# Patient Record
Sex: MALE | Race: BLACK OR AFRICAN AMERICAN | NOT HISPANIC OR LATINO | Employment: FULL TIME | URBAN - METROPOLITAN AREA
[De-identification: names, ages, dates, MRNs, and addresses within clinical notes are randomized per-mention and may not be internally consistent; named-entity substitution may affect disease eponyms.]

---

## 2023-02-21 LAB
APPEARANCE, URINE: CLEAR
BILIRUBIN, URINE: NEGATIVE
BLOOD, URINE: ABNORMAL
COLOR, URINE: YELLOW
GLUCOSE, URINE: NEGATIVE MG/DL
KETONES, URINE: NEGATIVE MG/DL
LEUKOCYTE ESTERASE, URINE: ABNORMAL
MUCUS, URINE: ABNORMAL /LPF
NITRITE, URINE: NEGATIVE
PH, URINE: 6.5 (ref 5–8)
PROTEIN, URINE: NEGATIVE MG/DL
RBC, URINE: 9 /HPF (ref 0–5)
SPECIFIC GRAVITY, URINE: 1.02 (ref 1–1.03)
UROBILINOGEN, URINE: <2 MG/DL (ref 0–1.9)
WBC, URINE: 9 /HPF (ref 0–5)

## 2023-02-22 LAB — URINE CULTURE: NO GROWTH

## 2023-03-13 LAB
ALANINE AMINOTRANSFERASE (SGPT) (U/L) IN SER/PLAS: 30 U/L (ref 10–52)
ALBUMIN (G/DL) IN SER/PLAS: 4.7 G/DL (ref 3.4–5)
ALKALINE PHOSPHATASE (U/L) IN SER/PLAS: 73 U/L (ref 33–136)
ANION GAP IN SER/PLAS: 13 MMOL/L (ref 10–20)
APPEARANCE, URINE: ABNORMAL
ASPARTATE AMINOTRANSFERASE (SGOT) (U/L) IN SER/PLAS: 21 U/L (ref 9–39)
BILIRUBIN TOTAL (MG/DL) IN SER/PLAS: 0.9 MG/DL (ref 0–1.2)
BILIRUBIN, URINE: NEGATIVE
BLOOD, URINE: NEGATIVE
CALCIUM (MG/DL) IN SER/PLAS: 10.2 MG/DL (ref 8.6–10.6)
CARBON DIOXIDE, TOTAL (MMOL/L) IN SER/PLAS: 28 MMOL/L (ref 21–32)
CHLORIDE (MMOL/L) IN SER/PLAS: 106 MMOL/L (ref 98–107)
COLOR, URINE: ABNORMAL
CREATININE (MG/DL) IN SER/PLAS: 1.32 MG/DL (ref 0.5–1.3)
ERYTHROCYTE DISTRIBUTION WIDTH (RATIO) BY AUTOMATED COUNT: 13.8 % (ref 11.5–14.5)
ERYTHROCYTE MEAN CORPUSCULAR HEMOGLOBIN CONCENTRATION (G/DL) BY AUTOMATED: 31.4 G/DL (ref 32–36)
ERYTHROCYTE MEAN CORPUSCULAR VOLUME (FL) BY AUTOMATED COUNT: 89 FL (ref 80–100)
ERYTHROCYTES (10*6/UL) IN BLOOD BY AUTOMATED COUNT: 5.05 X10E12/L (ref 4.5–5.9)
GFR MALE: 59 ML/MIN/1.73M2
GLUCOSE (MG/DL) IN SER/PLAS: 98 MG/DL (ref 74–99)
GLUCOSE, URINE: NEGATIVE MG/DL
HEMATOCRIT (%) IN BLOOD BY AUTOMATED COUNT: 44.9 % (ref 41–52)
HEMOGLOBIN (G/DL) IN BLOOD: 14.1 G/DL (ref 13.5–17.5)
KETONES, URINE: NEGATIVE MG/DL
LEUKOCYTE ESTERASE, URINE: ABNORMAL
LEUKOCYTES (10*3/UL) IN BLOOD BY AUTOMATED COUNT: 6.1 X10E9/L (ref 4.4–11.3)
MUCUS, URINE: ABNORMAL /LPF
NITRITE, URINE: NEGATIVE
NRBC (PER 100 WBCS) BY AUTOMATED COUNT: 0 /100 WBC (ref 0–0)
PH, URINE: 5 (ref 5–8)
PLATELETS (10*3/UL) IN BLOOD AUTOMATED COUNT: 200 X10E9/L (ref 150–450)
POTASSIUM (MMOL/L) IN SER/PLAS: 3.9 MMOL/L (ref 3.5–5.3)
PROSTATE SPECIFIC AG (NG/ML) IN SER/PLAS: 0.46 NG/ML (ref 0–4)
PROTEIN TOTAL: 7.3 G/DL (ref 6.4–8.2)
PROTEIN, URINE: ABNORMAL MG/DL
RBC, URINE: 17 /HPF (ref 0–5)
SODIUM (MMOL/L) IN SER/PLAS: 143 MMOL/L (ref 136–145)
SPECIFIC GRAVITY, URINE: 1.03 (ref 1–1.03)
SQUAMOUS EPITHELIAL CELLS, URINE: 5 /HPF
THYROTROPIN (MIU/L) IN SER/PLAS BY DETECTION LIMIT <= 0.05 MIU/L: 1.05 MIU/L (ref 0.44–3.98)
URATE (MG/DL) IN SER/PLAS: 6.8 MG/DL (ref 4–7.5)
UREA NITROGEN (MG/DL) IN SER/PLAS: 17 MG/DL (ref 6–23)
UROBILINOGEN, URINE: 2 MG/DL (ref 0–1.9)
WBC, URINE: 51 /HPF (ref 0–5)

## 2023-04-01 LAB
BACTERIA, URINE: ABNORMAL /HPF
MUCUS, URINE: ABNORMAL /LPF
RBC, URINE: 12 /HPF (ref 0–5)
SQUAMOUS EPITHELIAL CELLS, URINE: 4 /HPF
WBC, URINE: 11 /HPF (ref 0–5)

## 2023-04-02 LAB — URINE CULTURE: NO GROWTH

## 2023-07-26 LAB — URINE CULTURE: NORMAL

## 2023-09-30 PROBLEM — M54.41 CHRONIC BILATERAL LOW BACK PAIN WITH BILATERAL SCIATICA: Status: ACTIVE | Noted: 2023-09-30

## 2023-09-30 PROBLEM — M54.42 CHRONIC BILATERAL LOW BACK PAIN WITH BILATERAL SCIATICA: Status: ACTIVE | Noted: 2023-09-30

## 2023-09-30 PROBLEM — R20.0 BILATERAL HAND NUMBNESS: Status: ACTIVE | Noted: 2023-09-30

## 2023-09-30 PROBLEM — M71.20 BAKER'S CYST: Status: ACTIVE | Noted: 2023-09-30

## 2023-09-30 PROBLEM — M76.61 ACHILLES TENDINITIS OF RIGHT LOWER EXTREMITY: Status: ACTIVE | Noted: 2023-09-30

## 2023-09-30 PROBLEM — N20.0 NEPHROLITHIASIS: Status: ACTIVE | Noted: 2023-09-30

## 2023-09-30 PROBLEM — K63.1 COLON PERFORATION (MULTI): Status: ACTIVE | Noted: 2023-09-30

## 2023-09-30 PROBLEM — I10 ESSENTIAL HYPERTENSION: Status: ACTIVE | Noted: 2023-09-30

## 2023-09-30 PROBLEM — M65.332 TRIGGER MIDDLE FINGER OF LEFT HAND: Status: ACTIVE | Noted: 2023-09-30

## 2023-09-30 PROBLEM — N40.0 BPH (BENIGN PROSTATIC HYPERPLASIA): Status: ACTIVE | Noted: 2023-09-30

## 2023-09-30 PROBLEM — A64 STD (MALE): Status: ACTIVE | Noted: 2023-09-30

## 2023-09-30 PROBLEM — K57.30 DIVERTICULA OF COLON: Status: ACTIVE | Noted: 2023-09-30

## 2023-09-30 PROBLEM — M17.0 PRIMARY OSTEOARTHRITIS OF BOTH KNEES: Status: ACTIVE | Noted: 2023-09-30

## 2023-09-30 PROBLEM — Z96.1 PSEUDOPHAKIA OF LEFT EYE: Status: ACTIVE | Noted: 2023-09-30

## 2023-09-30 PROBLEM — M17.12 LEFT KNEE DJD: Status: ACTIVE | Noted: 2023-09-30

## 2023-09-30 PROBLEM — G89.29 CHRONIC BILATERAL LOW BACK PAIN WITH BILATERAL SCIATICA: Status: ACTIVE | Noted: 2023-09-30

## 2023-09-30 PROBLEM — C61 ADENOCARCINOMA OF PROSTATE (MULTI): Status: ACTIVE | Noted: 2023-09-30

## 2023-09-30 PROBLEM — K57.80: Status: ACTIVE | Noted: 2023-09-30

## 2023-09-30 PROBLEM — M50.30 DEGENERATIVE DISC DISEASE, CERVICAL: Status: ACTIVE | Noted: 2023-09-30

## 2023-09-30 PROBLEM — K92.2 LOWER GI BLEEDING: Status: ACTIVE | Noted: 2023-09-30

## 2023-09-30 PROBLEM — I67.2 INTRACRANIAL ATHEROSCLEROSIS: Status: ACTIVE | Noted: 2023-09-30

## 2023-09-30 PROBLEM — E55.9 VITAMIN D DEFICIENCY: Status: ACTIVE | Noted: 2023-09-30

## 2023-09-30 PROBLEM — R93.1 AGATSTON CORONARY ARTERY CALCIUM SCORE GREATER THAN 400: Status: ACTIVE | Noted: 2023-09-30

## 2023-09-30 PROBLEM — T78.3XXA ANGIOEDEMA: Status: ACTIVE | Noted: 2023-09-30

## 2023-09-30 PROBLEM — R19.4 CHANGE IN BOWEL HABITS: Status: ACTIVE | Noted: 2023-09-30

## 2023-09-30 PROBLEM — R20.2 NUMBNESS AND TINGLING OF BOTH FEET: Status: ACTIVE | Noted: 2023-09-30

## 2023-09-30 PROBLEM — M25.462 KNEE EFFUSION, LEFT: Status: ACTIVE | Noted: 2023-09-30

## 2023-09-30 PROBLEM — K57.80 DIVERTICULITIS OF INTESTINE WITH PERFORATION: Status: ACTIVE | Noted: 2023-09-30

## 2023-09-30 PROBLEM — G45.9 TIA (TRANSIENT ISCHEMIC ATTACK): Status: ACTIVE | Noted: 2023-09-30

## 2023-09-30 PROBLEM — M48.061 LUMBAR SPINAL STENOSIS: Status: ACTIVE | Noted: 2023-09-30

## 2023-09-30 PROBLEM — R63.4 WEIGHT LOSS, UNINTENTIONAL: Status: ACTIVE | Noted: 2023-09-30

## 2023-09-30 PROBLEM — R39.198 DIFFICULTY URINATING: Status: ACTIVE | Noted: 2023-09-30

## 2023-09-30 PROBLEM — H59.022: Status: ACTIVE | Noted: 2023-09-30

## 2023-09-30 PROBLEM — K20.90 ESOPHAGITIS: Status: ACTIVE | Noted: 2023-09-30

## 2023-09-30 PROBLEM — H59.022 RETAINED LENS MATERIAL FOLLOWING CATARACT SURGERY OF LEFT EYE: Status: ACTIVE | Noted: 2023-09-30

## 2023-09-30 PROBLEM — E78.5 DYSLIPIDEMIA: Status: ACTIVE | Noted: 2023-09-30

## 2023-09-30 PROBLEM — H53.8 BLURRY VISION, LEFT EYE: Status: ACTIVE | Noted: 2023-09-30

## 2023-09-30 PROBLEM — H25.811 COMBINED FORM OF AGE-RELATED CATARACT, RIGHT EYE: Status: ACTIVE | Noted: 2023-09-30

## 2023-09-30 PROBLEM — R20.0 NUMBNESS AND TINGLING OF BOTH FEET: Status: ACTIVE | Noted: 2023-09-30

## 2023-09-30 PROBLEM — M17.0 ARTHRITIS OF BOTH KNEES: Status: ACTIVE | Noted: 2023-09-30

## 2023-09-30 PROBLEM — H40.009 GLAUCOMA SUSPECT: Status: ACTIVE | Noted: 2023-09-30

## 2023-09-30 PROBLEM — G56.03 CARPAL TUNNEL SYNDROME ON BOTH SIDES: Status: ACTIVE | Noted: 2023-09-30

## 2023-09-30 PROBLEM — R20.2 PARESTHESIA OF FINGER: Status: ACTIVE | Noted: 2023-09-30

## 2023-09-30 PROBLEM — I66.03 STENOSIS OF BOTH MIDDLE CEREBRAL ARTERIES: Status: ACTIVE | Noted: 2023-09-30

## 2023-09-30 PROBLEM — I25.10 ARTERIOSCLEROTIC CORONARY ARTERY DISEASE: Status: ACTIVE | Noted: 2023-09-30

## 2023-09-30 PROBLEM — N52.9 ED (ERECTILE DYSFUNCTION): Status: ACTIVE | Noted: 2023-09-30

## 2023-09-30 PROBLEM — M10.9 GOUT: Status: ACTIVE | Noted: 2023-09-30

## 2023-09-30 PROBLEM — H40.013 OPEN ANGLE WITH BORDERLINE FINDINGS AND LOW GLAUCOMA RISK IN BOTH EYES: Status: ACTIVE | Noted: 2023-09-30

## 2023-09-30 PROBLEM — D64.9 ANEMIA: Status: ACTIVE | Noted: 2023-09-30

## 2023-09-30 PROBLEM — M76.60 TENDONITIS, ACHILLES: Status: ACTIVE | Noted: 2023-09-30

## 2023-09-30 RX ORDER — HYDROGEN PEROXIDE 3 %
1 SOLUTION, NON-ORAL MISCELLANEOUS DAILY
COMMUNITY

## 2023-09-30 RX ORDER — IBUPROFEN 400 MG/1
1 TABLET ORAL 3 TIMES DAILY PRN
COMMUNITY
Start: 2019-06-23

## 2023-09-30 RX ORDER — ALLOPURINOL 300 MG/1
1 TABLET ORAL DAILY PRN
COMMUNITY
End: 2023-11-16 | Stop reason: WASHOUT

## 2023-09-30 RX ORDER — MELOXICAM 15 MG/1
1 TABLET ORAL DAILY
COMMUNITY
Start: 2021-12-02 | End: 2023-10-18 | Stop reason: SDUPTHER

## 2023-09-30 RX ORDER — TAMSULOSIN HYDROCHLORIDE 0.4 MG/1
1 CAPSULE ORAL NIGHTLY
COMMUNITY
Start: 2016-09-12 | End: 2024-02-28 | Stop reason: ALTCHOICE

## 2023-09-30 RX ORDER — METHOCARBAMOL 750 MG/1
1 TABLET, FILM COATED ORAL 3 TIMES DAILY PRN
COMMUNITY
End: 2024-02-15

## 2023-09-30 RX ORDER — COLCHICINE 0.6 MG/1
2 CAPSULE ORAL
COMMUNITY
Start: 2020-02-12

## 2023-09-30 RX ORDER — ALLOPURINOL 100 MG/1
1 TABLET ORAL 3 TIMES DAILY
COMMUNITY
Start: 2021-12-02

## 2023-09-30 RX ORDER — TADALAFIL 20 MG/1
1 TABLET ORAL
COMMUNITY

## 2023-09-30 RX ORDER — ASPIRIN 81 MG/1
1 TABLET ORAL DAILY
COMMUNITY

## 2023-10-05 ENCOUNTER — OFFICE VISIT (OUTPATIENT)
Dept: UROLOGY | Facility: CLINIC | Age: 68
End: 2023-10-05
Payer: MEDICARE

## 2023-10-05 VITALS
DIASTOLIC BLOOD PRESSURE: 81 MMHG | TEMPERATURE: 96.7 F | RESPIRATION RATE: 16 BRPM | HEART RATE: 77 BPM | BODY MASS INDEX: 29.02 KG/M2 | OXYGEN SATURATION: 99 % | SYSTOLIC BLOOD PRESSURE: 128 MMHG | WEIGHT: 214 LBS

## 2023-10-05 DIAGNOSIS — C61 ADENOCARCINOMA OF PROSTATE (MULTI): Primary | ICD-10-CM

## 2023-10-05 DIAGNOSIS — N13.8 BENIGN PROSTATIC HYPERPLASIA WITH URINARY OBSTRUCTION: ICD-10-CM

## 2023-10-05 DIAGNOSIS — N52.9 VASCULOGENIC ERECTILE DYSFUNCTION, UNSPECIFIED VASCULOGENIC ERECTILE DYSFUNCTION TYPE: ICD-10-CM

## 2023-10-05 DIAGNOSIS — N40.1 BENIGN PROSTATIC HYPERPLASIA WITH URINARY OBSTRUCTION: ICD-10-CM

## 2023-10-05 PROCEDURE — 3074F SYST BP LT 130 MM HG: CPT | Performed by: STUDENT IN AN ORGANIZED HEALTH CARE EDUCATION/TRAINING PROGRAM

## 2023-10-05 PROCEDURE — 3079F DIAST BP 80-89 MM HG: CPT | Performed by: STUDENT IN AN ORGANIZED HEALTH CARE EDUCATION/TRAINING PROGRAM

## 2023-10-05 PROCEDURE — 99215 OFFICE O/P EST HI 40 MIN: CPT | Mod: PO | Performed by: STUDENT IN AN ORGANIZED HEALTH CARE EDUCATION/TRAINING PROGRAM

## 2023-10-05 PROCEDURE — 1159F MED LIST DOCD IN RCRD: CPT | Performed by: STUDENT IN AN ORGANIZED HEALTH CARE EDUCATION/TRAINING PROGRAM

## 2023-10-05 PROCEDURE — 1160F RVW MEDS BY RX/DR IN RCRD: CPT | Performed by: STUDENT IN AN ORGANIZED HEALTH CARE EDUCATION/TRAINING PROGRAM

## 2023-10-05 PROCEDURE — 99215 OFFICE O/P EST HI 40 MIN: CPT | Performed by: STUDENT IN AN ORGANIZED HEALTH CARE EDUCATION/TRAINING PROGRAM

## 2023-10-05 PROCEDURE — 1126F AMNT PAIN NOTED NONE PRSNT: CPT | Performed by: STUDENT IN AN ORGANIZED HEALTH CARE EDUCATION/TRAINING PROGRAM

## 2023-10-05 ASSESSMENT — PAIN SCALES - GENERAL: PAINLEVEL: 0-NO PAIN

## 2023-10-05 NOTE — PROGRESS NOTES
UROLOGIC FOLLOW-UP VISIT     PROBLEM LIST:  Prostate cancer  Obstructive LUTS      HISTORY OF PRESENT ILLNESS:   67-year-old male with history of recurrent nephrolithiasis, prostate cancer status post radiation therapy with stable PSA, BPH and erectile dysfunction.  Patient presents today for follow-up on his obstructive lower urinary tract symptoms.  He was started on Flomax and we doubled the dose at his last appointment patient states he is able to empty his bladder but continues to have a very slow stream and postvoid dribbling as well as urinary hesitancy.  Patient endorses occasional split stream.  He otherwise denies any hematuria, dysuria, fevers, chills, nausea, vomiting.    PAST MEDICAL HISTORY:  No past medical history on file.    PAST SURGICAL HISTORY:  Past Surgical History:   Procedure Laterality Date    MR HEAD ANGIO WO IV CONTRAST  6/25/2020    MR HEAD ANGIO WO IV CONTRAST 6/25/2020 [facility]    MR NECK ANGIO WO IV CONTRAST  6/25/2020    MR NECK ANGIO WO IV CONTRAST 6/25/2020 [facility]    OTHER SURGICAL HISTORY  07/08/2019    No history of surgery        ALLERGIES:   Allergies   Allergen Reactions    Ace Inhibitors Angioedema and Unknown    Hydrochlorothiazide Swelling    Iodinated Contrast Media Unknown    Iodine Other     Gout    Losartan Unknown    Shellfish Derived Unknown and Other     Shellfish-derived Products    Gout        Current Outpatient Medications on File Prior to Visit   Medication Sig Dispense Refill    allopurinol (Zyloprim) 100 mg tablet Take 1 tablet (100 mg) by mouth 3 times a day.      allopurinol (Zyloprim) 300 mg tablet Take 1 tablet (300 mg) by mouth once daily as needed.      aspirin 81 mg EC tablet Take 1 tablet (81 mg) by mouth once daily.      colchicine, gout, (Mitigare) 0.6 mg capsule capsule Take 2 capsules (1.2 mg) by mouth. FOR ONE DOSE THEN REPEAT ANOTHER TAB 1-2 HOURS LATER. MAY REPEAT IN 3 DAYS      esomeprazole (NexIUM) 20 mg DR capsule  Take 1 capsule (20 mg) by mouth once daily.      ibuprofen 400 mg tablet Take 1 tablet (400 mg) by mouth 3 times a day as needed.      meloxicam (Mobic) 15 mg tablet Take 1 tablet (15 mg) by mouth once daily. Take with food      methocarbamol (Robaxin) 750 mg tablet Take 1 tablet (750 mg) by mouth 3 times a day as needed for muscle spasms (and pain).      tadalafil (Cialis) 20 mg tablet Take 1 tablet (20 mg) by mouth. 1 hour before activity as needed      tamsulosin (Flomax) 0.4 mg 24 hr capsule Take 1 capsule (0.4 mg) by mouth once daily at bedtime.       No current facility-administered medications on file prior to visit.          SOCIAL HISTORY:  Patient     Social History     Socioeconomic History    Marital status:      Spouse name: Not on file    Number of children: Not on file    Years of education: Not on file    Highest education level: Not on file   Occupational History    Not on file   Tobacco Use    Smoking status: Not on file    Smokeless tobacco: Not on file   Substance and Sexual Activity    Alcohol use: Not on file    Drug use: Not on file    Sexual activity: Not on file   Other Topics Concern    Not on file   Social History Narrative    Not on file     Social Determinants of Health     Financial Resource Strain: Not on file   Food Insecurity: Not on file   Transportation Needs: Not on file   Physical Activity: Not on file   Stress: Not on file   Social Connections: Not on file   Intimate Partner Violence: Not on file   Housing Stability: Not on file       FAMILY HISTORY:  Family History   Problem Relation Name Age of Onset    Prostate cancer Father         REVIEW OF SYSTEMS:  Constitutional: Negative for fever and chills. Denies anorexia, weight loss.  Eyes: Negative for visual disturbance.   Respiratory: Negative for shortness of breath.    Cardiovascular: Negative for chest pain.   Gastrointestinal: Negative for nausea and vomiting.   Genitourinary: See interval history above.  Skin:  Negative for rash.   Neurological: Negative for dizziness and numbness.   Psychiatric/Behavioral: Negative for confusion and decreased concentration.     PHYSICAL EXAM:  Blood pressure 128/81, pulse 77, temperature 35.9 °C (96.7 °F), resp. rate 16, weight 97.1 kg (214 lb), SpO2 99 %.  Constitutional: Patient appears well-developed and well-nourished. No distress.    Head: Normocephalic and atraumatic.    Neck: Normal range of motion.    Cardiovascular: Normal rate.    Pulmonary/Chest: Effort normal. No respiratory distress.   Abdominal: soft NTND  Musculoskeletal: Normal range of motion.    Neurological: Alert and oriented to person, place, and time.  Psychiatric: Normal mood and affect. Behavior is normal. Thought content normal.      Lab Results   Component Value Date    BUN 21 06/26/2023    CREATININE 1.51 (H) 06/26/2023     06/26/2023    K 4.2 06/26/2023     06/26/2023    CO2 24 06/26/2023    CALCIUM 9.5 06/26/2023      Lab Results   Component Value Date    WBC 6.3 06/26/2023    RBC 4.82 06/26/2023    HGB 13.6 06/26/2023    HCT 39.9 (L) 06/26/2023    MCV 83 06/26/2023    MCHC 34.1 06/26/2023    RDW 14.0 06/26/2023     06/26/2023        Lab Results   Component Value Date    PSA 0.46 03/12/2023    PSA 0.46 03/12/2023    PSA 1.36 02/14/2023    PSA 0.34 01/03/2023    PSA 0.34 07/26/2022    PSA 0.39 01/25/2022    PSA 0.24 09/28/2021    PSA 0.33 05/13/2021    PSA 0.41 03/30/2021    PSA 0.69 02/28/2020    PSA 0.80 09/10/2019    PSA 0.62 04/05/2019    PSA 0.69 09/28/2018    PSA 1.37 03/16/2018       Assessment:     1. Adenocarcinoma of prostate (CMS/HCC)        2. Benign prostatic hyperplasia with urinary obstruction        3. Vasculogenic erectile dysfunction, unspecified vasculogenic erectile dysfunction type            Plan:   - we reviewed his prior abd/pel scan and I interpreted this image for him to show the obstruction caused by the prostate  -counseled patient that I recommend undergoing a ALMANZAR  procedure, likely a TURP  -we discussed the steps to this procedure and anticipated recovery  -we also reviewed the risk of the procedure which include hematuria, UTI and pain, discussed with patient that he would need to discontinue his NSAIDs but can continue his 81mg asa      45 minutes total spent on patient's care today; >50% time spent on counseling/coordination of care

## 2023-10-18 DIAGNOSIS — M10.00 ACUTE IDIOPATHIC GOUT, UNSPECIFIED SITE: Primary | ICD-10-CM

## 2023-10-18 RX ORDER — MELOXICAM 15 MG/1
15 TABLET ORAL DAILY
Qty: 30 TABLET | Refills: 11 | Status: SHIPPED | OUTPATIENT
Start: 2023-10-18 | End: 2024-10-17

## 2023-10-27 VITALS — BODY MASS INDEX: 29.02 KG/M2 | WEIGHT: 214 LBS

## 2023-10-27 DIAGNOSIS — N40.1 BENIGN PROSTATIC HYPERPLASIA (BPH) WITH STRAINING ON URINATION: ICD-10-CM

## 2023-10-27 DIAGNOSIS — R39.16 BENIGN PROSTATIC HYPERPLASIA (BPH) WITH STRAINING ON URINATION: ICD-10-CM

## 2023-10-27 RX ORDER — ONDANSETRON 4 MG/1
4 TABLET, ORALLY DISINTEGRATING ORAL ONCE
Status: CANCELLED | OUTPATIENT
Start: 2023-10-27 | End: 2023-10-27

## 2023-10-27 RX ORDER — SODIUM CHLORIDE 9 MG/ML
100 INJECTION, SOLUTION INTRAVENOUS CONTINUOUS
Status: CANCELLED | OUTPATIENT
Start: 2023-10-27

## 2023-11-06 NOTE — PROGRESS NOTES
Patient will be scheduled for transurethral resection of prostate procedure on 11/28/2023 with Dr. Reyes Wilhelm.    single, lives with brother, unemployed

## 2023-11-08 ENCOUNTER — TELEPHONE (OUTPATIENT)
Dept: PREADMISSION TESTING | Facility: HOSPITAL | Age: 68
End: 2023-11-08
Payer: MEDICARE

## 2023-11-16 ENCOUNTER — LAB (OUTPATIENT)
Dept: LAB | Facility: LAB | Age: 68
End: 2023-11-16
Payer: MEDICARE

## 2023-11-16 ENCOUNTER — PRE-ADMISSION TESTING (OUTPATIENT)
Dept: PREADMISSION TESTING | Facility: HOSPITAL | Age: 68
End: 2023-11-16
Payer: MEDICARE

## 2023-11-16 VITALS
BODY MASS INDEX: 27.36 KG/M2 | OXYGEN SATURATION: 99 % | HEART RATE: 69 BPM | SYSTOLIC BLOOD PRESSURE: 138 MMHG | TEMPERATURE: 95.5 F | WEIGHT: 213.19 LBS | DIASTOLIC BLOOD PRESSURE: 82 MMHG | RESPIRATION RATE: 18 BRPM | HEIGHT: 74 IN

## 2023-11-16 DIAGNOSIS — R39.198 DIFFICULTY URINATING: ICD-10-CM

## 2023-11-16 DIAGNOSIS — I10 ESSENTIAL HYPERTENSION: ICD-10-CM

## 2023-11-16 DIAGNOSIS — I10 ESSENTIAL HYPERTENSION: Primary | ICD-10-CM

## 2023-11-16 LAB
ABO GROUP (TYPE) IN BLOOD: NORMAL
ANION GAP SERPL CALC-SCNC: 12 MMOL/L (ref 10–20)
ANTIBODY SCREEN: NORMAL
BASOPHILS # BLD AUTO: 0.04 X10*3/UL (ref 0–0.1)
BASOPHILS NFR BLD AUTO: 0.8 %
BUN SERPL-MCNC: 16 MG/DL (ref 6–23)
CALCIUM SERPL-MCNC: 9.8 MG/DL (ref 8.6–10.3)
CHLORIDE SERPL-SCNC: 108 MMOL/L (ref 98–107)
CO2 SERPL-SCNC: 26 MMOL/L (ref 21–32)
CREAT SERPL-MCNC: 1.31 MG/DL (ref 0.5–1.3)
EOSINOPHIL # BLD AUTO: 0.11 X10*3/UL (ref 0–0.7)
EOSINOPHIL NFR BLD AUTO: 2.3 %
ERYTHROCYTE [DISTWIDTH] IN BLOOD BY AUTOMATED COUNT: 14.1 % (ref 11.5–14.5)
GFR SERPL CREATININE-BSD FRML MDRD: 60 ML/MIN/1.73M*2
GLUCOSE SERPL-MCNC: 84 MG/DL (ref 74–99)
HCT VFR BLD AUTO: 43.2 % (ref 41–52)
HGB BLD-MCNC: 13.9 G/DL (ref 13.5–17.5)
IMM GRANULOCYTES # BLD AUTO: 0.03 X10*3/UL (ref 0–0.7)
IMM GRANULOCYTES NFR BLD AUTO: 0.6 % (ref 0–0.9)
LYMPHOCYTES # BLD AUTO: 1.73 X10*3/UL (ref 1.2–4.8)
LYMPHOCYTES NFR BLD AUTO: 36.3 %
MCH RBC QN AUTO: 27.6 PG (ref 26–34)
MCHC RBC AUTO-ENTMCNC: 32.2 G/DL (ref 32–36)
MCV RBC AUTO: 86 FL (ref 80–100)
MONOCYTES # BLD AUTO: 0.37 X10*3/UL (ref 0.1–1)
MONOCYTES NFR BLD AUTO: 7.8 %
NEUTROPHILS # BLD AUTO: 2.48 X10*3/UL (ref 1.2–7.7)
NEUTROPHILS NFR BLD AUTO: 52.2 %
NRBC BLD-RTO: 0 /100 WBCS (ref 0–0)
PLATELET # BLD AUTO: 183 X10*3/UL (ref 150–450)
POTASSIUM SERPL-SCNC: 4.7 MMOL/L (ref 3.5–5.3)
RBC # BLD AUTO: 5.03 X10*6/UL (ref 4.5–5.9)
RH FACTOR (ANTIGEN D): NORMAL
SODIUM SERPL-SCNC: 141 MMOL/L (ref 136–145)
WBC # BLD AUTO: 4.8 X10*3/UL (ref 4.4–11.3)

## 2023-11-16 PROCEDURE — 80048 BASIC METABOLIC PNL TOTAL CA: CPT

## 2023-11-16 PROCEDURE — 85025 COMPLETE CBC W/AUTO DIFF WBC: CPT

## 2023-11-16 PROCEDURE — 87086 URINE CULTURE/COLONY COUNT: CPT

## 2023-11-16 PROCEDURE — 86900 BLOOD TYPING SEROLOGIC ABO: CPT

## 2023-11-16 PROCEDURE — 36415 COLL VENOUS BLD VENIPUNCTURE: CPT

## 2023-11-16 PROCEDURE — 86850 RBC ANTIBODY SCREEN: CPT

## 2023-11-16 PROCEDURE — 93005 ELECTROCARDIOGRAM TRACING: CPT | Performed by: NURSE PRACTITIONER

## 2023-11-16 PROCEDURE — 86901 BLOOD TYPING SEROLOGIC RH(D): CPT

## 2023-11-16 PROCEDURE — 99204 OFFICE O/P NEW MOD 45 MIN: CPT | Performed by: NURSE PRACTITIONER

## 2023-11-16 ASSESSMENT — ENCOUNTER SYMPTOMS
CONSTITUTIONAL NEGATIVE: 1
NEUROLOGICAL NEGATIVE: 1
CARDIOVASCULAR NEGATIVE: 1
RESPIRATORY NEGATIVE: 1
NECK NEGATIVE: 1
GASTROINTESTINAL NEGATIVE: 1
ENDOCRINE NEGATIVE: 1
MUSCULOSKELETAL NEGATIVE: 1
EYES NEGATIVE: 1

## 2023-11-16 NOTE — PREPROCEDURE INSTRUCTIONS
Medication List            Accurate as of November 16, 2023  8:10 AM. Always use your most recent med list.                allopurinol 100 mg tablet  Commonly known as: Zyloprim  Notes to patient: May take morning of surgery     aspirin 81 mg EC tablet  Notes to patient: Stop for 5 days before surgery. Last dose 11/22/23      ibuprofen 400 mg tablet  Notes to patient: Stop for 5 days before surgery     meloxicam 15 mg tablet  Commonly known as: Mobic  Take 1 tablet (15 mg) by mouth once daily. Take with food  Notes to patient: Stop 5 days before surgery     methocarbamol 750 mg tablet  Commonly known as: Robaxin  Notes to patient: May take morning of surgery     Mitigare 0.6 mg capsule capsule  Generic drug: colchicine  Notes to patient: May take morning of surgery     NexIUM 20 mg DR capsule  Generic drug: esomeprazole  Notes to patient: May take morning of surgery     tadalafil 20 mg tablet  Commonly known as: Cialis  Medication Adjustments for Surgery: Stop 3 days before surgery     tamsulosin 0.4 mg 24 hr capsule  Commonly known as: Flomax  Notes to patient: May take morning of surgery            CONTACT SURGEON'S OFFICE IF YOU DEVELOP:  * Fever = 100.4 F   * New respiratory symptoms (e.g. cough, shortness of breath, respiratory distress, sore throat)  * Recent loss of taste or smell  *Flu like symptoms such as headache, fatigue or gastrointestinal symptoms  * You develop any open sores, shingles, burning or painful urination   AND/OR:  * You no longer wish to have the surgery.  * Any other personal circumstances change that may lead to the need to cancel or defer this surgery.  *You were admitted to any hospital within one week of your planned procedure.    SMOKING:  *Quitting smoking can make a huge difference to your health and recovery from surgery.    *If you need help with quitting, call 7-356-QUIT-NOW.    THE DAY BEFORE SURGERY:  *Do not eat any food after midnight the night before surgery.   *You are  permitted to drink clear liquids (i.e. water, black coffee, tea, clear broth, apple juice) up to 2 hours before your surgery.  DIABETICS:  Please check fasting blood sugar  upon waking up.  If fasting sugar is <80 mg/dl, please drink 100ml/3oz of apple juice no later than 2 hours prior to surgery.      SURGICAL TIME  *You will be contacted between 2 p.m. and 6 p.m. the business day before your surgery with your arrival time.  *If you haven't received a call by 6pm, call [phone].  *Scheduled surgery times may change and you will be notified if this occurs-check your personal voicemail for any updates.    ON THE MORNING OF SURGERY:  *Wear comfortable, loose fitting clothing.   *Do not use moisturizers, creams, lotions or perfume.  *All jewelry and valuables should be left at home.  *Prosthetic devices such as contact lenses, hearing aids, dentures, eyelash extensions, hairpins and body piercing must be removed before surgery.    BRING WITH YOU:  *Photo ID and insurance card  *Current list of medicines and allergies  *Pacemaker/Defibrillator/Heart stent cards  *CPAP machine and mask  *Slings/splints/crutches  *Copy of your complete Advanced Directive/DHPOA-if applicable  *Neurostimulator implant remote    PARKING AND ARRIVAL:  *Check in at the Main Entrance desk and let them know you are here for surgery.  *You will be directed to the 2nd floor surgical waiting area.    AFTER OUTPATIENT SURGERY:  *A responsible adult MUST accompany you at the time of discharge and stay with you for 24 hours after your surgery.  *You may NOT drive yourself home after surgery.  *You may use a taxi or ride sharing service ([employer], [employer]) to return home ONLY if you are accompanied by a friend or family member.  *Instructions for resuming your medications will be provided by your surgeon.

## 2023-11-16 NOTE — CPM/PAT H&P
[facility]/PAT Evaluation       Name: Arthur Harley (Arthur Harley)  /Age: 1955/68 y.o.     In-Person       Date of Consult: 23    Referring Provider:  Dr. Wilhelm    Surgery, Date, and Length:  23, transurethral resection of prostate, 90 minutes    Patient presents to [facility] for perioperative risk assessment prior to scheduled surgery. He presents with prostate cancer s/p radiation therapy with stable PSA, BPH, and ED. He still complains of persistent LUTS. He would like to proceed with TURP to relieve obstruction.    This note was created in part upon personal review of patient's medical records.    Pt denies any past history of anesthetic complications such as PONV, awareness, prolonged sedation, dental damage, aspiration, cardiac arrest, difficult intubation, difficult I.V. access or unexpected hospital admissions.  No history of malignant hyperthermia and or pseudocholinesterase deficiency.    History of blood transfusion for GI bleed .    The patient is not a Worship and will accept blood and blood products if medically indicated.     Type and screen sent.    Past Medical History:   Diagnosis Date    Arthritis     BPH (benign prostatic hyperplasia)     ED (erectile dysfunction)     Glaucoma     Gout     Hyperlipidemia     Hypertension     Nephrolithiasis     Prostate cancer (CMS/HCC)     radiation therapy completed 2009    Renal calculi     TIA (transient ischemic attack)     Vitamin D deficiency        Past Surgical History:   Procedure Laterality Date    COLONOSCOPY      KNEE ARTHROSCOPY W/ ACL RECONSTRUCTION      MR HEAD ANGIO WO IV CONTRAST  2020    MR HEAD ANGIO WO IV CONTRAST 2020 [facility]    MR NECK ANGIO WO IV CONTRAST  2020    MR NECK ANGIO WO IV CONTRAST [facility]    ORIF RADIUS & ULNA FRACTURES         Family History   Problem Relation Name Age of Onset    Prostate cancer Father         Allergies    Allergen Reactions    Ace Inhibitors Angioedema and Unknown    Hydrochlorothiazide Swelling    Iodinated Contrast Media Unknown    Iodine Other     Gout    Losartan Unknown    Shellfish Derived Unknown and Other     Shellfish-derived Products    Gout         Current Outpatient Medications:     allopurinol (Zyloprim) 100 mg tablet, Take 1 tablet (100 mg) by mouth 3 times a day., Disp: , Rfl:     aspirin 81 mg EC tablet, Take 1 tablet (81 mg) by mouth once daily., Disp: , Rfl:     esomeprazole (NexIUM) 20 mg DR capsule, Take 1 capsule (20 mg) by mouth once daily., Disp: , Rfl:     meloxicam (Mobic) 15 mg tablet, Take 1 tablet (15 mg) by mouth once daily. Take with food, Disp: 30 tablet, Rfl: 11    tadalafil (Cialis) 20 mg tablet, Take 1 tablet (20 mg) by mouth. 1 hour before activity as needed, Disp: , Rfl:     tamsulosin (Flomax) 0.4 mg 24 hr capsule, Take 1 capsule (0.4 mg) by mouth once daily at bedtime., Disp: , Rfl:     colchicine, gout, (Mitigare) 0.6 mg capsule capsule, Take 2 capsules (1.2 mg) by mouth. FOR ONE DOSE THEN REPEAT ANOTHER TAB 1-2 HOURS LATER. MAY REPEAT IN 3 DAYS, Disp: , Rfl:     ibuprofen 400 mg tablet, Take 1 tablet (400 mg) by mouth 3 times a day as needed., Disp: , Rfl:     methocarbamol (Robaxin) 750 mg tablet, Take 1 tablet (750 mg) by mouth 3 times a day as needed for muscle spasms (and pain)., Disp: , Rfl:        PAT ROS:   Constitutional:   neg    Neuro/Psych:   neg    Eyes:   neg    Ears:   neg    Nose:   neg    Mouth:   neg    Throat:   neg    Neck:   neg    Cardio:   neg    Respiratory:   neg    Endocrine:   neg    GI:   neg    :   neg    Musculoskeletal:   neg    Hematologic:   neg    Skin:  neg        Physical Exam  Vitals reviewed. Physical exam within normal limits.          PAT AIRWAY:   Airway:     Mallampati::  II    Neck ROM::  Full   No broken teeth, no dentures and no missing teeth        Visit Vitals  /82   Pulse 69   Temp 35.3 °C (95.5 °F)   Resp 18   Ht 1.88 m  "(6' 2\")   Wt 96.7 kg (213 lb 3 oz)   SpO2 99%   BMI 27.37 kg/m²   Smoking Status Never Assessed   BSA 2.25 m²     Assessment and Plan:     Patient is a 68 year old male scheduled for TURP with Dr. Wilhelm on 11/28/23.    Patient is at acceptable risk to proceed with planned surgical procedure. Further cardiac risk stratification deferred at this time.This patient is low risk candidate undergoing moderate risk procedure, patient is medically optimized for surgery.      Plan    Neuro:    Hx of TIA- no deficits, on daily Aspirin 81mg. Will stop for 5 days prior to urology surgery.    Cardiovascular:    Patient denies any chest pain, tightness, heaviness, pressure, radiating pain, palpitations, irregular heartbeats, lightheadedness, cough, congestion, shortness of breath, MARQUEZ, PND, near syncope, weight loss or gain.    Good functional capacity    EKG in PAT on 11/16/23 :  Normal sinus rhythm HR 68 bpm  Left axis deviation  Septal infarct age undetermined  Abnormal ECG    RCRI: 0     HTN- not currently on medication, BP in /82.  HLD- not currently on medication    GI/:    BPH/hx of prostate cancer- pending TURP    Heme:  Patient instructed to ambulate as soon as possible postoperatively to decrease thromboembolic risk.    Initiate mechanical DVT prophylaxis as soon as possible and initiate chemical prophylaxis when deemed safe from a bleeding standpoint post surgery.    Caprini=7     Risk assessment complete.  Patient is scheduled for a intermediate surgical risk procedure.  He is considered medically optimized for the planned procedure.      Labs/testing obtained in PAT on 11/16/23: CBC, BMP, T&S, URINE CX, EKG.  Lab Results   Component Value Date    WBC 4.8 11/16/2023    HGB 13.9 11/16/2023    HCT 43.2 11/16/2023    MCV 86 11/16/2023     11/16/2023     Lab Results   Component Value Date    GLUCOSE 84 11/16/2023    CALCIUM 9.8 11/16/2023     11/16/2023    K 4.7 11/16/2023    CO2 26 11/16/2023    CL " 108 (H) 11/16/2023    BUN 16 11/16/2023    CREATININE 1.31 (H) 11/16/2023     Follow up: none    Preoperative medication instructions were provided and reviewed with the patient.  Any additional testing or evaluation was explained to the patient.  Nothing by mouth instructions were discussed and patient's questions were answered prior to conclusion to this encounter.  Patient verbalized understanding of preoperative instructions given in preadmission testing; discharge instructions available in EMR.    This note was dictated with speech recognition.  Minor errors may have been detected during use of speech recognition.

## 2023-11-17 LAB — BACTERIA UR CULT: NORMAL

## 2023-11-27 ENCOUNTER — ANESTHESIA EVENT (OUTPATIENT)
Dept: OPERATING ROOM | Facility: HOSPITAL | Age: 68
End: 2023-11-27
Payer: MEDICARE

## 2023-11-28 ENCOUNTER — HOSPITAL ENCOUNTER (OUTPATIENT)
Facility: HOSPITAL | Age: 68
Setting detail: OUTPATIENT SURGERY
Discharge: HOME | End: 2023-11-28
Attending: STUDENT IN AN ORGANIZED HEALTH CARE EDUCATION/TRAINING PROGRAM | Admitting: STUDENT IN AN ORGANIZED HEALTH CARE EDUCATION/TRAINING PROGRAM
Payer: MEDICARE

## 2023-11-28 ENCOUNTER — ANESTHESIA (OUTPATIENT)
Dept: OPERATING ROOM | Facility: HOSPITAL | Age: 68
End: 2023-11-28
Payer: MEDICARE

## 2023-11-28 VITALS
DIASTOLIC BLOOD PRESSURE: 90 MMHG | BODY MASS INDEX: 24.87 KG/M2 | TEMPERATURE: 96.8 F | RESPIRATION RATE: 13 BRPM | HEIGHT: 78 IN | WEIGHT: 214.95 LBS | HEART RATE: 63 BPM | SYSTOLIC BLOOD PRESSURE: 146 MMHG | OXYGEN SATURATION: 99 %

## 2023-11-28 DIAGNOSIS — N40.1 BENIGN PROSTATIC HYPERPLASIA WITH URINARY OBSTRUCTION: Primary | ICD-10-CM

## 2023-11-28 DIAGNOSIS — N13.8 BENIGN PROSTATIC HYPERPLASIA WITH URINARY OBSTRUCTION: Primary | ICD-10-CM

## 2023-11-28 PROCEDURE — 2500000004 HC RX 250 GENERAL PHARMACY W/ HCPCS (ALT 636 FOR OP/ED): Performed by: ANESTHESIOLOGY

## 2023-11-28 PROCEDURE — 2500000004 HC RX 250 GENERAL PHARMACY W/ HCPCS (ALT 636 FOR OP/ED): Performed by: STUDENT IN AN ORGANIZED HEALTH CARE EDUCATION/TRAINING PROGRAM

## 2023-11-28 PROCEDURE — 3600000008 HC OR TIME - EACH INCREMENTAL 1 MINUTE - PROCEDURE LEVEL THREE: Performed by: STUDENT IN AN ORGANIZED HEALTH CARE EDUCATION/TRAINING PROGRAM

## 2023-11-28 PROCEDURE — 2500000001 HC RX 250 WO HCPCS SELF ADMINISTERED DRUGS (ALT 637 FOR MEDICARE OP): Performed by: ANESTHESIOLOGY

## 2023-11-28 PROCEDURE — 2500000004 HC RX 250 GENERAL PHARMACY W/ HCPCS (ALT 636 FOR OP/ED): Performed by: ANESTHESIOLOGIST ASSISTANT

## 2023-11-28 PROCEDURE — 2720000007 HC OR 272 NO HCPCS: Performed by: STUDENT IN AN ORGANIZED HEALTH CARE EDUCATION/TRAINING PROGRAM

## 2023-11-28 PROCEDURE — 2500000005 HC RX 250 GENERAL PHARMACY W/O HCPCS: Performed by: ANESTHESIOLOGIST ASSISTANT

## 2023-11-28 PROCEDURE — A4217 STERILE WATER/SALINE, 500 ML: HCPCS | Performed by: STUDENT IN AN ORGANIZED HEALTH CARE EDUCATION/TRAINING PROGRAM

## 2023-11-28 PROCEDURE — 7100000010 HC PHASE TWO TIME - EACH INCREMENTAL 1 MINUTE: Performed by: STUDENT IN AN ORGANIZED HEALTH CARE EDUCATION/TRAINING PROGRAM

## 2023-11-28 PROCEDURE — 3700000002 HC GENERAL ANESTHESIA TIME - EACH INCREMENTAL 1 MINUTE: Performed by: STUDENT IN AN ORGANIZED HEALTH CARE EDUCATION/TRAINING PROGRAM

## 2023-11-28 PROCEDURE — 3600000003 HC OR TIME - INITIAL BASE CHARGE - PROCEDURE LEVEL THREE: Performed by: STUDENT IN AN ORGANIZED HEALTH CARE EDUCATION/TRAINING PROGRAM

## 2023-11-28 PROCEDURE — 7100000009 HC PHASE TWO TIME - INITIAL BASE CHARGE: Performed by: STUDENT IN AN ORGANIZED HEALTH CARE EDUCATION/TRAINING PROGRAM

## 2023-11-28 PROCEDURE — 7100000002 HC RECOVERY ROOM TIME - EACH INCREMENTAL 1 MINUTE: Performed by: STUDENT IN AN ORGANIZED HEALTH CARE EDUCATION/TRAINING PROGRAM

## 2023-11-28 PROCEDURE — A52601 PR TRANSURETHRAL ELEC-SURG PROSTATECTOM: Performed by: ANESTHESIOLOGIST ASSISTANT

## 2023-11-28 PROCEDURE — 52601 PROSTATECTOMY (TURP): CPT | Performed by: STUDENT IN AN ORGANIZED HEALTH CARE EDUCATION/TRAINING PROGRAM

## 2023-11-28 PROCEDURE — 2500000005 HC RX 250 GENERAL PHARMACY W/O HCPCS: Performed by: STUDENT IN AN ORGANIZED HEALTH CARE EDUCATION/TRAINING PROGRAM

## 2023-11-28 PROCEDURE — A52601 PR TRANSURETHRAL ELEC-SURG PROSTATECTOM: Performed by: ANESTHESIOLOGY

## 2023-11-28 PROCEDURE — 7100000001 HC RECOVERY ROOM TIME - INITIAL BASE CHARGE: Performed by: STUDENT IN AN ORGANIZED HEALTH CARE EDUCATION/TRAINING PROGRAM

## 2023-11-28 PROCEDURE — 3700000001 HC GENERAL ANESTHESIA TIME - INITIAL BASE CHARGE: Performed by: STUDENT IN AN ORGANIZED HEALTH CARE EDUCATION/TRAINING PROGRAM

## 2023-11-28 RX ORDER — ONDANSETRON HYDROCHLORIDE 2 MG/ML
4 INJECTION, SOLUTION INTRAVENOUS ONCE AS NEEDED
Status: DISCONTINUED | OUTPATIENT
Start: 2023-11-28 | End: 2023-11-28 | Stop reason: HOSPADM

## 2023-11-28 RX ORDER — PROPOFOL 10 MG/ML
INJECTION, EMULSION INTRAVENOUS AS NEEDED
Status: DISCONTINUED | OUTPATIENT
Start: 2023-11-28 | End: 2023-11-28

## 2023-11-28 RX ORDER — ONDANSETRON HYDROCHLORIDE 2 MG/ML
INJECTION, SOLUTION INTRAVENOUS AS NEEDED
Status: DISCONTINUED | OUTPATIENT
Start: 2023-11-28 | End: 2023-11-28

## 2023-11-28 RX ORDER — FUROSEMIDE 10 MG/ML
INJECTION INTRAMUSCULAR; INTRAVENOUS AS NEEDED
Status: DISCONTINUED | OUTPATIENT
Start: 2023-11-28 | End: 2023-11-28

## 2023-11-28 RX ORDER — NORETHINDRONE AND ETHINYL ESTRADIOL 0.5-0.035
KIT ORAL AS NEEDED
Status: DISCONTINUED | OUTPATIENT
Start: 2023-11-28 | End: 2023-11-28

## 2023-11-28 RX ORDER — SODIUM CHLORIDE, SODIUM LACTATE, POTASSIUM CHLORIDE, CALCIUM CHLORIDE 600; 310; 30; 20 MG/100ML; MG/100ML; MG/100ML; MG/100ML
100 INJECTION, SOLUTION INTRAVENOUS CONTINUOUS
Status: DISCONTINUED | OUTPATIENT
Start: 2023-11-28 | End: 2023-11-28 | Stop reason: HOSPADM

## 2023-11-28 RX ORDER — HYDRALAZINE HYDROCHLORIDE 20 MG/ML
5 INJECTION INTRAMUSCULAR; INTRAVENOUS EVERY 30 MIN PRN
Status: DISCONTINUED | OUTPATIENT
Start: 2023-11-28 | End: 2023-11-28 | Stop reason: HOSPADM

## 2023-11-28 RX ORDER — PHENYLEPHRINE HCL IN 0.9% NACL 0.4MG/10ML
SYRINGE (ML) INTRAVENOUS AS NEEDED
Status: DISCONTINUED | OUTPATIENT
Start: 2023-11-28 | End: 2023-11-28

## 2023-11-28 RX ORDER — ONDANSETRON 4 MG/1
4 TABLET, ORALLY DISINTEGRATING ORAL ONCE
Status: COMPLETED | OUTPATIENT
Start: 2023-11-28 | End: 2023-11-28

## 2023-11-28 RX ORDER — MIDAZOLAM HYDROCHLORIDE 1 MG/ML
INJECTION, SOLUTION INTRAMUSCULAR; INTRAVENOUS AS NEEDED
Status: DISCONTINUED | OUTPATIENT
Start: 2023-11-28 | End: 2023-11-28

## 2023-11-28 RX ORDER — AMLODIPINE BESYLATE 5 MG/1
6 TABLET ORAL DAILY
COMMUNITY

## 2023-11-28 RX ORDER — TRAMADOL HYDROCHLORIDE 50 MG/1
50 TABLET ORAL EVERY 6 HOURS PRN
Qty: 10 TABLET | Refills: 0 | Status: SHIPPED | OUTPATIENT
Start: 2023-11-28 | End: 2023-12-03

## 2023-11-28 RX ORDER — FENTANYL CITRATE 50 UG/ML
INJECTION, SOLUTION INTRAMUSCULAR; INTRAVENOUS AS NEEDED
Status: DISCONTINUED | OUTPATIENT
Start: 2023-11-28 | End: 2023-11-28

## 2023-11-28 RX ORDER — LIDOCAINE HYDROCHLORIDE 20 MG/ML
INJECTION, SOLUTION INFILTRATION; PERINEURAL AS NEEDED
Status: DISCONTINUED | OUTPATIENT
Start: 2023-11-28 | End: 2023-11-28

## 2023-11-28 RX ORDER — DEXAMETHASONE SODIUM PHOSPHATE 4 MG/ML
INJECTION, SOLUTION INTRA-ARTICULAR; INTRALESIONAL; INTRAMUSCULAR; INTRAVENOUS; SOFT TISSUE AS NEEDED
Status: DISCONTINUED | OUTPATIENT
Start: 2023-11-28 | End: 2023-11-28

## 2023-11-28 RX ORDER — PHENAZOPYRIDINE HYDROCHLORIDE 200 MG/1
200 TABLET, FILM COATED ORAL 3 TIMES DAILY PRN
Qty: 10 TABLET | Refills: 0 | Status: SHIPPED | OUTPATIENT
Start: 2023-11-28 | End: 2023-12-12

## 2023-11-28 RX ORDER — OXYCODONE HYDROCHLORIDE 5 MG/1
5 TABLET ORAL EVERY 4 HOURS PRN
Status: DISCONTINUED | OUTPATIENT
Start: 2023-11-28 | End: 2023-11-28 | Stop reason: HOSPADM

## 2023-11-28 RX ORDER — SODIUM CHLORIDE 0.9 G/100ML
IRRIGANT IRRIGATION AS NEEDED
Status: DISCONTINUED | OUTPATIENT
Start: 2023-11-28 | End: 2023-11-28 | Stop reason: HOSPADM

## 2023-11-28 RX ORDER — CEFAZOLIN SODIUM 2 G/100ML
2 INJECTION, SOLUTION INTRAVENOUS ONCE
Status: COMPLETED | OUTPATIENT
Start: 2023-11-28 | End: 2023-11-28

## 2023-11-28 RX ORDER — SODIUM CHLORIDE 9 MG/ML
100 INJECTION, SOLUTION INTRAVENOUS CONTINUOUS
Status: DISCONTINUED | OUTPATIENT
Start: 2023-11-28 | End: 2023-11-28 | Stop reason: HOSPADM

## 2023-11-28 RX ADMIN — FENTANYL CITRATE 50 MCG: 50 INJECTION, SOLUTION INTRAMUSCULAR; INTRAVENOUS at 13:52

## 2023-11-28 RX ADMIN — FUROSEMIDE 10 MG: 10 INJECTION, SOLUTION INTRAMUSCULAR; INTRAVENOUS at 15:00

## 2023-11-28 RX ADMIN — Medication 200 MCG: at 14:16

## 2023-11-28 RX ADMIN — CEFAZOLIN SODIUM 2 G: 2 INJECTION, SOLUTION INTRAVENOUS at 13:58

## 2023-11-28 RX ADMIN — PROPOFOL 200 MG: 10 INJECTION, EMULSION INTRAVENOUS at 13:52

## 2023-11-28 RX ADMIN — ONDANSETRON 4 MG: 2 INJECTION INTRAMUSCULAR; INTRAVENOUS at 15:04

## 2023-11-28 RX ADMIN — FENTANYL CITRATE 50 MCG: 50 INJECTION, SOLUTION INTRAMUSCULAR; INTRAVENOUS at 14:40

## 2023-11-28 RX ADMIN — Medication 200 MCG: at 14:20

## 2023-11-28 RX ADMIN — Medication 200 MCG: at 14:04

## 2023-11-28 RX ADMIN — SODIUM CHLORIDE, SODIUM LACTATE, POTASSIUM CHLORIDE, AND CALCIUM CHLORIDE: 600; 310; 30; 20 INJECTION, SOLUTION INTRAVENOUS at 13:41

## 2023-11-28 RX ADMIN — FENTANYL CITRATE 50 MCG: 50 INJECTION, SOLUTION INTRAMUSCULAR; INTRAVENOUS at 14:11

## 2023-11-28 RX ADMIN — OXYCODONE HYDROCHLORIDE 5 MG: 5 TABLET ORAL at 16:17

## 2023-11-28 RX ADMIN — ONDANSETRON 4 MG: 4 TABLET, ORALLY DISINTEGRATING ORAL at 12:11

## 2023-11-28 RX ADMIN — HYDROMORPHONE HYDROCHLORIDE 0.5 MG: 1 INJECTION, SOLUTION INTRAMUSCULAR; INTRAVENOUS; SUBCUTANEOUS at 15:43

## 2023-11-28 RX ADMIN — SODIUM CHLORIDE, POTASSIUM CHLORIDE, SODIUM LACTATE AND CALCIUM CHLORIDE 100 ML/HR: 600; 310; 30; 20 INJECTION, SOLUTION INTRAVENOUS at 15:15

## 2023-11-28 RX ADMIN — MIDAZOLAM HYDROCHLORIDE 2 MG: 1 INJECTION, SOLUTION INTRAMUSCULAR; INTRAVENOUS at 13:39

## 2023-11-28 RX ADMIN — EPHEDRINE SULFATE 10 MG: 50 INJECTION, SOLUTION INTRAVENOUS at 14:29

## 2023-11-28 RX ADMIN — DEXAMETHASONE SODIUM PHOSPHATE 4 MG: 4 INJECTION, SOLUTION INTRAMUSCULAR; INTRAVENOUS at 15:04

## 2023-11-28 RX ADMIN — LIDOCAINE HYDROCHLORIDE 100 MG: 20 INJECTION, SOLUTION INFILTRATION; PERINEURAL at 13:52

## 2023-11-28 SDOH — HEALTH STABILITY: MENTAL HEALTH: CURRENT SMOKER: 0

## 2023-11-28 ASSESSMENT — PAIN - FUNCTIONAL ASSESSMENT
PAIN_FUNCTIONAL_ASSESSMENT: 0-10

## 2023-11-28 ASSESSMENT — PAIN DESCRIPTION - LOCATION
LOCATION: PERINEUM
LOCATION: PERINEUM

## 2023-11-28 ASSESSMENT — PAIN SCALES - GENERAL
PAINLEVEL_OUTOF10: 3
PAINLEVEL_OUTOF10: 3
PAINLEVEL_OUTOF10: 7
PAINLEVEL_OUTOF10: 4
PAINLEVEL_OUTOF10: 6
PAINLEVEL_OUTOF10: 0 - NO PAIN
PAINLEVEL_OUTOF10: 6
PAINLEVEL_OUTOF10: 7
PAINLEVEL_OUTOF10: 3
PAINLEVEL_OUTOF10: 3

## 2023-11-28 ASSESSMENT — COLUMBIA-SUICIDE SEVERITY RATING SCALE - C-SSRS
2. HAVE YOU ACTUALLY HAD ANY THOUGHTS OF KILLING YOURSELF?: NO
6. HAVE YOU EVER DONE ANYTHING, STARTED TO DO ANYTHING, OR PREPARED TO DO ANYTHING TO END YOUR LIFE?: NO
1. IN THE PAST MONTH, HAVE YOU WISHED YOU WERE DEAD OR WISHED YOU COULD GO TO SLEEP AND NOT WAKE UP?: NO

## 2023-11-28 NOTE — DISCHARGE SUMMARY
Discharge Diagnosis  BPH (benign prostatic hyperplasia)    Issues Requiring Follow-Up  Catheter removal    Test Results Pending At Discharge  Pending Labs       No current pending labs.            Hospital Course   Patient was admitted to undergo transurethral resection of the prostate. Patient tolerated the surgery well and was discharged home in stable condition.     Pertinent Physical Exam At Time of Discharge  Physical Exam    Home Medications     Medication List      CONTINUE taking these medications     allopurinol 100 mg tablet; Commonly known as: Zyloprim   amLODIPine 5 mg tablet; Commonly known as: Norvasc   aspirin 81 mg EC tablet   ibuprofen 400 mg tablet   meloxicam 15 mg tablet; Commonly known as: Mobic; Take 1 tablet (15 mg)   by mouth once daily. Take with food   methocarbamol 750 mg tablet; Commonly known as: Robaxin   Mitigare 0.6 mg capsule capsule; Generic drug: colchicine   NexIUM 20 mg DR capsule; Generic drug: esomeprazole   tadalafil 20 mg tablet; Commonly known as: Cialis   tamsulosin 0.4 mg 24 hr capsule; Commonly known as: Flomax       Outpatient Follow-Up  Future Appointments   Date Time Provider Department [place]   12/7/2023  8:15 AM Errol Charles MD [id number] [facility]   12/18/2023  9:30 AM Reyes Wilhelm MD [facility]       Reyes Wilhelm MD

## 2023-11-28 NOTE — ANESTHESIA PREPROCEDURE EVALUATION
Patient: Arthur Harley    Procedure Information       Date/Time: 11/28/23 1430    Procedure: Prostate Transurethral Resection    [facility]: [facility] / [facility]    Surgeons: Reyes Wilhelm MD          67 yo M hx prostate CA s/p XRT, OA, Gout, HTN, TIA in 2020 on baby ASA.  No issues with anesthesia.    Relevant Problems   Anesthesia   No history of complications History of anesthesia complications      Cardiovascular   (+) Arteriosclerotic coronary artery disease   (+) Essential hypertension      GI   (+) Lower GI bleeding      /Renal   (+) Nephrolithiasis      Neuro/Psych   (+) Carpal tunnel syndrome on both sides      Hematology   (+) Anemia      Musculoskeletal   (+) Carpal tunnel syndrome on both sides   (+) Chronic bilateral low back pain with bilateral sciatica   (+) Degenerative disc disease, cervical   (+) Left knee DJD   (+) Lumbar spinal stenosis   (+) Primary osteoarthritis of both knees      Infectious Disease   (+) STD (male)      Other   (+) Arthritis of both knees       Clinical information reviewed:   Tobacco  Allergies  Meds   Med Hx  Surg Hx   Fam Hx  Soc Hx        NPO Detail:  NPO/Void Status  Carbonhydrate Drink Given Prior to Surgery? : N  Date of Last Liquid: 11/27/23  Time of Last Liquid: 2000  Date of Last Solid: 11/28/23  Time of Last Solid: 0600  Last Intake Type: Clear fluids  Time of Last Void: 1000         Physical Exam    Airway  Mallampati: II  TM distance: >3 FB  Neck ROM: full     Cardiovascular   Rate: normal     Dental - normal exam     Pulmonary   Breath sounds clear to auscultation     Abdominal            Anesthesia Plan    ASA 2     general     The patient is not a current smoker.    intravenous induction   Postoperative administration of opioids is intended.  Anesthetic plan and risks discussed with patient.

## 2023-11-28 NOTE — ANESTHESIA PROCEDURE NOTES
Airway  Date/Time: 11/28/2023 1:54 PM  Urgency: elective    Airway not difficult    Staffing  Performed: [facility]   Authorized by: TERRANCE Corey    Performed by: TERRANCE Corey  Patient location during procedure: OR    Indications and Patient Condition  Indications for airway management: anesthesia  Spontaneous ventilation: present  Sedation level: moderate (conscious sedation)  Preoxygenated: yes  Patient position: sniffing  Mask difficulty assessment: 1 - vent by mask  Planned trial extubation    Final Airway Details  Final airway type: supraglottic airway      Successful airway: Size 5     Number of attempts at approach: 1

## 2023-11-28 NOTE — DISCHARGE INSTRUCTIONS
Call [phone] during regular daytime business hours (8:00 am - 5:00 pm) and after 5:00 pm and ask for the Urology resident with any questions or concerns.       If it is a life-threatening situation, proceed to the nearest emergency department.         Follow-up appointment:  Call to schedule      Thank you for the opportunity to care for you today.  Your health and healing are very important to us.  We hope we made you feel as comfortable as possible and are committed to your recovery and continued well-being.       The following is a brief overview of your transurethral prostate resection today. Some of the information contained on this summary may be confidential.  This information should be kept in your records and should be shared with your regular doctor.     Physicians:   Dr. Wilhelm       Procedure performed: Prostate Resection     What to Expect During your Recovery and Home Care  Anesthesia Side Effects   You received  anesthesia today.  You may feel sleepy, tired, or have a sore throat.   You may also feel drowsiness, dizziness, or inability to think clearly.  For your safety, do not drive, drink alcoholic beverages, take any unprescribed medication or make any important decisions for 24 hours.  A responsible adult should be with you for 24 hours.        Activity and Recovery    No heavy lifting over ten pounds. Limit activity while urinary catheter is in place. Avoid activities that would cause pulling or tugging on your catheter.     Do not drive or operate heavy machinery while taking narcotic pain medications as these medications can alter perception, impair judgement, and slow reaction times.     Pain Control  Unfortunately, you may experience pain after your procedure.  Adequate management can include alternative measures to help ease your pain and can include over the counter Tylenol can be taken as prescribed as needed for breakthrough pain. Do not take more than 4,000mg of Tylenol in a 24-hour  period.       You may also experience bladder spasms due to the catheter. Ditropan may be prescribed to help alleviate this problem.     Nausea/Vomiting   Clear liquids are best tolerated at first. Start slow, advance your diet as tolerated to normal foods. Avoid spicy, greasy, heavy foods at first. Also, you may feel nauseous or like you need to vomit if you take any type of medication on an empty stomach.  Call your physician if you are unable to eat or drink and have persistent vomiting.     Signs of Bleeding   You are going to have some blood in your urine. Your urine will be light pink to yellow. You always want to look at the urine in the tubing of your catheter and not in the large urine collection bag to check for bleeding. If urine becomes thick dark tamika red, has large clots or stops draining, please notify your physician.     Treatment/wound care:   Keep area(s) clean and dry. Clean around the tip or your penis were the catheter goes in daily with mild soap and water.  It is okay to shower 24 hours after time of surgery.    Do not submerge your catheter in standing water until seen for follow up appointment (no tub bathing, swimming, or hot tubs).       Signs of Infection  Signs of infection can include fever, drainage(green/yellow), chills, burning sensation with passing of urine, catheter leakage, or severe abdominal pain.  If you see any of these occur, please contact your doctor's office at [phone].  Any fever higher than 100.4, especially if associated with an ill feeling, abdominal pain, chills, or nausea should be reported to your surgeon.       Assist in bowel movements/urination  Increase fiber in diet  Increase water (6 to 8 glasses)  Increase walking      Additional Instructions: CATHETER CARE  Always keep the catheters tubing and drainage bag below the level of your bladder.  Avoid loops and kinks in the catheter tubing.    NOTIFY your physician if catheter falls out or catheter seems  clogged and urine is not draining.   Do not wear the small leg bag to bed you should be provided with a larger overnight bag that you should wear to bed and can hang over the side of the bed.  We recommend wearing the large bag in the shower, as this is easy to dry, and you do not get your leg straps wet from your leg bag.   Your catheter should be secured to your upper thigh, do not allow it to hang or dangle.  Your catheter will be removed at your post-operative appointment.

## 2023-11-28 NOTE — ANESTHESIA POSTPROCEDURE EVALUATION
Patient: Arthur Harley    Procedure Summary       Date: 11/28/23 Room / Location: [facility] A OR 01 / Virtual [facility] A OR    Anesthesia Start: 1343 Anesthesia Stop: 1519    Procedure: Prostate Transurethral Resection Diagnosis:       Benign prostatic hyperplasia (BPH) with straining on urination      (Benign prostatic hyperplasia (BPH) with straining on urination [N40.1, R39.16])    Surgeons: Reyes Wilhelm MD Responsible Provider: Manuel Zhang MD    Anesthesia Type: general ASA Status: 2            Anesthesia Type: general    Vitals Value Taken Time   /98 11/28/23 1601   Temp 36 °C (96.8 °F) 11/28/23 1525   Pulse 63 11/28/23 1607   Resp 16 11/28/23 1600   SpO2 98 % 11/28/23 1607   Vitals shown include unvalidated device data.    Anesthesia Post Evaluation    Patient participation: complete - patient participated  Level of consciousness: awake  Pain management: adequate  Airway patency: patent  Cardiovascular status: acceptable and hemodynamically stable  Respiratory status: acceptable  Hydration status: acceptable  Postoperative Nausea and Vomiting: none        No notable events documented.

## 2023-11-28 NOTE — OP NOTE
Prostate Transurethral Resection Operative Note     Date: 2023  OR Location: [facility] OR    Name: Arthur Harley, : 1955, Age: 68 y.o., MRN: [id number], Sex: male    Diagnosis  Pre-op Diagnosis     * Benign prostatic hyperplasia (BPH) with straining on urination [N40.1, R39.16] Post-op Diagnosis     * Benign prostatic hyperplasia (BPH) with straining on urination [N40.1, R39.16]     Procedures  Prostate Transurethral Resection  60023 - CT TRURL ELECTROSURG RESCJ PROSTATE BLEED COMPLETE      Surgeons      * Reyes Wilhelm - Primary    Resident/Fellow/Other Assistant:  Surgeon(s) and Role:    Procedure Summary  Anesthesia: General  ASA: ASA status not filed in the log.  Anesthesia Staff: SUSAN-AA: TERRANCE Corey  Estimated Blood Loss: < 5 mL  Intra-op Medications: * No intraprocedure medications in log *    Drains and/or Catheters:18 Hungarian catheter    Implants: None    Findings: Trilobar hypertrophy    Indications: Arthur Harley is an 68 y.o. male who is having surgery for Benign prostatic hyperplasia (BPH) with straining on urination [N40.1, R39.16].     The patient was seen in the preoperative area. The risks, benefits, complications, treatment options, non-operative alternatives, expected recovery and outcomes were discussed with the patient. The possibilities of reaction to medication, pulmonary aspiration, injury to surrounding structures, bleeding, recurrent infection, the need for additional procedures, failure to diagnose a condition, and creating a complication requiring transfusion or operation were discussed with the patient. The patient concurred with the proposed plan, giving informed consent.  The site of surgery was properly noted/marked if necessary per policy. The patient has been actively warmed in preoperative area. Preoperative antibiotics have been ordered and given within 1 hours of incision. Venous thrombosis prophylaxis have been ordered including bilateral sequential  compression devices    Procedure Details:     After informed consent was obtained the patient's imaging was reviewed and he was taken to the operating room where he is placed in supine position.  All pressure points were padded and the patient was secured to the operating table.  A preinduction huddle was performed verifying patient's identity and anticipated procedure.  Patient was then placed under general anesthesia and antibiotics were administered.  He was then converted to the dorsolithotomy position prepped and draped in the usual sterile fashion.  To begin the case we used a 22 Kosovan rigid cystoscope this was inserted to urethral meatus and advanced to the urethra which we encountered some narrowing. The urethra was then serially dilated using urethral sounds. The cystoscope was then easily advanced to the level of the bladder a complete cystoscopy was performed and was grossly negative.  Patient was noted to have trilobar hypertrophy on cystoscopy.  We then removed the cystoscope and assembled our resectoscope with the visual obturator this was inserted to the urethral meatus and advanced to the level of the bladder.  The visual obturator was removed and our resecting loop was placed into our resectoscope.  We identified the ureteral orifices bilaterally and these were noted to be away from our area of the anticipated resection.  We started off by resecting the prostate at 5 and 7:00 down towards the prostatic capsule and extended this distally to the verumontanum.  After we were in a good depth of resection we then took down the lateral lobes on the right side followed by the left side.  This resection was carried out distally towards the verumontanum and matched the layer of depth from the initial portion of the resection.  After this was completed we ensured good hemostasis by cauterizing all bleeding vessels.  The scope was backed out to the verumontanum and the irrigation was turned off to verify no  active bleeding and there was none.  We then used an Ellik evacuator to remove all prostate chips.  We then ensured there was good hemostasis 1 more time by turning off the irrigation and there was no active bleeding.  We visualized both ureteral orifices which were patent and effluxing clear yellow urine.  We then removed the scope visualizing the urethra on our way out.  A 18 Divehi Cast was then placed into the patient's bladder and is terminated the procedure.    Complications:  None; patient tolerated the procedure well.    Disposition: PACU - hemodynamically stable.  Condition: stable       Attending Attestation: I was present and scrubbed for the entire procedure.    Reyes Wilhelm  Phone Number: [phone]

## 2023-11-30 ENCOUNTER — APPOINTMENT (OUTPATIENT)
Dept: UROLOGY | Facility: CLINIC | Age: 68
End: 2023-11-30
Payer: MEDICARE

## 2023-11-30 ENCOUNTER — OFFICE VISIT (OUTPATIENT)
Dept: UROLOGY | Facility: CLINIC | Age: 68
End: 2023-11-30
Payer: MEDICARE

## 2023-11-30 VITALS
TEMPERATURE: 98.2 F | HEART RATE: 82 BPM | DIASTOLIC BLOOD PRESSURE: 84 MMHG | RESPIRATION RATE: 18 BRPM | SYSTOLIC BLOOD PRESSURE: 123 MMHG | OXYGEN SATURATION: 99 %

## 2023-11-30 DIAGNOSIS — N40.1 BENIGN PROSTATIC HYPERPLASIA WITH URINARY OBSTRUCTION: Primary | ICD-10-CM

## 2023-11-30 DIAGNOSIS — N13.8 BENIGN PROSTATIC HYPERPLASIA WITH URINARY OBSTRUCTION: Primary | ICD-10-CM

## 2023-11-30 PROCEDURE — 1036F TOBACCO NON-USER: CPT | Performed by: PHYSICIAN ASSISTANT

## 2023-11-30 PROCEDURE — 51700 IRRIGATION OF BLADDER: CPT | Mod: PO | Performed by: PHYSICIAN ASSISTANT

## 2023-11-30 PROCEDURE — 3074F SYST BP LT 130 MM HG: CPT | Performed by: PHYSICIAN ASSISTANT

## 2023-11-30 PROCEDURE — 1126F AMNT PAIN NOTED NONE PRSNT: CPT | Performed by: PHYSICIAN ASSISTANT

## 2023-11-30 PROCEDURE — 3079F DIAST BP 80-89 MM HG: CPT | Performed by: PHYSICIAN ASSISTANT

## 2023-11-30 PROCEDURE — 1160F RVW MEDS BY RX/DR IN RCRD: CPT | Performed by: PHYSICIAN ASSISTANT

## 2023-11-30 PROCEDURE — 1159F MED LIST DOCD IN RCRD: CPT | Performed by: PHYSICIAN ASSISTANT

## 2023-11-30 PROCEDURE — 51700 IRRIGATION OF BLADDER: CPT | Performed by: PHYSICIAN ASSISTANT

## 2023-11-30 PROCEDURE — 99024 POSTOP FOLLOW-UP VISIT: CPT | Performed by: PHYSICIAN ASSISTANT

## 2023-11-30 RX ORDER — SULFAMETHOXAZOLE AND TRIMETHOPRIM 800; 160 MG/1; MG/1
1 TABLET ORAL 2 TIMES DAILY
Qty: 1 TABLET | Refills: 0 | Status: SHIPPED | OUTPATIENT
Start: 2023-11-30 | End: 2023-12-01

## 2023-11-30 ASSESSMENT — ENCOUNTER SYMPTOMS
ALLERGIC/IMMUNOLOGIC NEGATIVE: 1
CONSTITUTIONAL NEGATIVE: 1
MUSCULOSKELETAL NEGATIVE: 1
RESPIRATORY NEGATIVE: 1
ENDOCRINE NEGATIVE: 1
NEUROLOGICAL NEGATIVE: 1
CARDIOVASCULAR NEGATIVE: 1
GASTROINTESTINAL NEGATIVE: 1
PSYCHIATRIC NEGATIVE: 1
EYES NEGATIVE: 1
HEMATOLOGIC/LYMPHATIC NEGATIVE: 1

## 2023-11-30 ASSESSMENT — PAIN SCALES - GENERAL: PAINLEVEL: 0-NO PAIN

## 2023-11-30 NOTE — ASSESSMENT & PLAN NOTE
status post TURP on 11/28/2023 with Dr. Wilhelm    Reviewed normal expectations and restrictions after surgery. Please limit heavy activity as this can increase your risk for bleeding (running, cycling, lifting, riding a ). Avoid constipation. Do not have relations for the next two weeks     Advised that you may have burning but that if it seems to resolve and then return to call the office. The pyridium that you were prescribed can be taken if you continue to have burning. However if you burning stops and restarts please call the office so that we can rule out an infection Please do not have relations for the next two weeks.     Continue Flomax for 2 weeks. Restart Aspirin 3 days after hematuria resolves. Prescription of one dose of Bactrim sent to pharmacy. I advised increasing fluid intake to reduce irritation.  If patient is not able to urinate I advised earlier follow-up or to go to the ER for Cast catheter placement.

## 2023-11-30 NOTE — PROGRESS NOTES
Subjective   Patient ID: Arthur Harley is a 68 y.o. male who presents for No chief complaint on file..  HPI    Pt is a 68 with hx of recurrent nephrolithiasis, prostate cancer tx with radiation therapy, BPH s/p TURP seen for voiding trial.     Patient denies any complications from procedure. He denies fever or chills.     Cast catheter draining pyridium tinged urine.     Voiding trial equivocal. Patient had a bladder spasms.     Review of Systems   Constitutional: Negative.    HENT: Negative.     Eyes: Negative.    Respiratory: Negative.     Cardiovascular: Negative.    Gastrointestinal: Negative.    Endocrine: Negative.    Genitourinary: Negative.    Musculoskeletal: Negative.    Skin: Negative.    Allergic/Immunologic: Negative.    Neurological: Negative.    Hematological: Negative.    Psychiatric/Behavioral: Negative.         Objective   Physical Exam  Constitutional:       General: He is not in acute distress.     Appearance: Normal appearance.   HENT:      Head: Normocephalic and atraumatic.      Nose: Nose normal.      Mouth/Throat:      Mouth: Mucous membranes are moist.   Cardiovascular:      Rate and Rhythm: Normal rate.   Pulmonary:      Effort: Pulmonary effort is normal.   Abdominal:      General: Abdomen is flat.      Palpations: Abdomen is soft.   Musculoskeletal:      Cervical back: Normal range of motion.   Neurological:      Mental Status: He is alert and oriented to person, place, and time.   Psychiatric:         Mood and Affect: Mood normal.         Behavior: Behavior normal.         Assessment/Plan   Problem List Items Addressed This Visit             ICD-10-CM    BPH (benign prostatic hyperplasia) - Primary N40.0     status post TURP on 11/28/2023 with Dr. Wilhelm    Reviewed normal expectations and restrictions after surgery. Please limit heavy activity as this can increase your risk for bleeding (running, cycling, lifting, riding a ). Avoid constipation. Do not have relations for  the next two weeks     Advised that you may have burning but that if it seems to resolve and then return to call the office. The pyridium that you were prescribed can be taken if you continue to have burning. However if you burning stops and restarts please call the office so that we can rule out an infection Please do not have relations for the next two weeks.     Continue Flomax for 2 weeks. Restart Aspirin 3 days after hematuria resolves. Prescription of one dose of Bactrim sent to pharmacy. I advised increasing fluid intake to reduce irritation.  If patient is not able to urinate I advised earlier follow-up or to go to the ER for Cast catheter placement.           Relevant Medications    sulfamethoxazole-trimethoprim (Bactrim DS) 800-160 mg tablet

## 2023-12-07 ENCOUNTER — OFFICE VISIT (OUTPATIENT)
Dept: UROLOGY | Facility: CLINIC | Age: 68
End: 2023-12-07
Payer: MEDICARE

## 2023-12-07 DIAGNOSIS — N13.8 BENIGN PROSTATIC HYPERPLASIA WITH URINARY OBSTRUCTION: Primary | ICD-10-CM

## 2023-12-07 DIAGNOSIS — N40.1 BENIGN PROSTATIC HYPERPLASIA WITH URINARY OBSTRUCTION: Primary | ICD-10-CM

## 2023-12-07 PROCEDURE — 87086 URINE CULTURE/COLONY COUNT: CPT | Performed by: PHYSICIAN ASSISTANT

## 2023-12-07 PROCEDURE — 51798 US URINE CAPACITY MEASURE: CPT | Performed by: PHYSICIAN ASSISTANT

## 2023-12-07 PROCEDURE — 1126F AMNT PAIN NOTED NONE PRSNT: CPT | Performed by: PHYSICIAN ASSISTANT

## 2023-12-07 PROCEDURE — 1036F TOBACCO NON-USER: CPT | Performed by: PHYSICIAN ASSISTANT

## 2023-12-07 PROCEDURE — 51798 US URINE CAPACITY MEASURE: CPT | Mod: PO,ZK | Performed by: PHYSICIAN ASSISTANT

## 2023-12-07 PROCEDURE — 1159F MED LIST DOCD IN RCRD: CPT | Performed by: PHYSICIAN ASSISTANT

## 2023-12-07 PROCEDURE — 1160F RVW MEDS BY RX/DR IN RCRD: CPT | Performed by: PHYSICIAN ASSISTANT

## 2023-12-07 PROCEDURE — 99024 POSTOP FOLLOW-UP VISIT: CPT | Performed by: PHYSICIAN ASSISTANT

## 2023-12-07 ASSESSMENT — ENCOUNTER SYMPTOMS
CONSTITUTIONAL NEGATIVE: 1
NEUROLOGICAL NEGATIVE: 1
GASTROINTESTINAL NEGATIVE: 1
EYES NEGATIVE: 1
RESPIRATORY NEGATIVE: 1
ALLERGIC/IMMUNOLOGIC NEGATIVE: 1
ENDOCRINE NEGATIVE: 1
PSYCHIATRIC NEGATIVE: 1
CARDIOVASCULAR NEGATIVE: 1
HEMATOLOGIC/LYMPHATIC NEGATIVE: 1
MUSCULOSKELETAL NEGATIVE: 1

## 2023-12-07 NOTE — PROGRESS NOTES
Subjective   Patient ID: Arthur Harley is a 68 y.o. male who presents for No chief complaint on file..  HPI    Pt is a 68 with hx of recurrent nephrolithiasis, prostate cancer tx with radiation therapy, BPH s/p TURP passed voiding trial on 11/30/23 seen sooner than scheduled due to stress incontinence      Patient reports since removal of Cast catheter he has been experiencing stress incontinence using about 3-4 depends a day.  He reports the stress incontinence is worse standing up.  He denies dysuria.  He denies feeling of incomplete voiding.  He reports hematuria is almost resolved.    UA positive for large leuks and blood.  Negative for nitrates  PVR 20    Review of Systems   Constitutional: Negative.    HENT: Negative.     Eyes: Negative.    Respiratory: Negative.     Cardiovascular: Negative.    Gastrointestinal: Negative.    Endocrine: Negative.    Genitourinary: Negative.    Musculoskeletal: Negative.    Skin: Negative.    Allergic/Immunologic: Negative.    Neurological: Negative.    Hematological: Negative.    Psychiatric/Behavioral: Negative.         Objective   Physical Exam  Constitutional:       General: He is not in acute distress.     Appearance: Normal appearance.   HENT:      Head: Normocephalic and atraumatic.      Nose: Nose normal.      Mouth/Throat:      Mouth: Mucous membranes are moist.   Cardiovascular:      Rate and Rhythm: Normal rate.   Pulmonary:      Effort: Pulmonary effort is normal.   Abdominal:      General: Abdomen is flat.      Palpations: Abdomen is soft.   Genitourinary:     Penis: Normal.       Comments: Small ulceration at the meatus  Musculoskeletal:      Cervical back: Normal range of motion.   Neurological:      Mental Status: He is alert.       Assessment/Plan            Tyrel Mccarty PA-C 12/07/23 1:54 PM

## 2023-12-07 NOTE — ASSESSMENT & PLAN NOTE
I advised initiating Kegel exercises to strengthen pelvic floor muscle.  If he continues to have persistent bothersome symptoms at 6 weeks we will discuss pelvic floor physical therapy.

## 2023-12-08 ENCOUNTER — TELEPHONE (OUTPATIENT)
Dept: UROLOGY | Facility: HOSPITAL | Age: 68
End: 2023-12-08
Payer: MEDICARE

## 2023-12-08 LAB — BACTERIA UR CULT: NO GROWTH

## 2023-12-08 NOTE — TELEPHONE ENCOUNTER
Spoke with Mr. Harley in regards to his lower urinary tract symptoms. He has stopped to Flomax and is currently experiencing stress incontinence symptoms. He also requested to have [facility] documentation completed and sent to Human resources: [email or web address] Ortiz, [phone]. He stated he will have the documentation.

## 2023-12-11 RX ORDER — TROSPIUM CHLORIDE ER 60 MG/1
60 CAPSULE ORAL
Qty: 30 CAPSULE | Refills: 11 | Status: SHIPPED | OUTPATIENT
Start: 2023-12-11 | End: 2024-12-10

## 2023-12-18 ENCOUNTER — OFFICE VISIT (OUTPATIENT)
Dept: UROLOGY | Facility: HOSPITAL | Age: 68
End: 2023-12-18
Payer: MEDICARE

## 2023-12-18 DIAGNOSIS — C61 ADENOCARCINOMA OF PROSTATE (MULTI): ICD-10-CM

## 2023-12-18 DIAGNOSIS — N40.1 BENIGN PROSTATIC HYPERPLASIA WITH URINARY OBSTRUCTION: ICD-10-CM

## 2023-12-18 DIAGNOSIS — N13.8 BENIGN PROSTATIC HYPERPLASIA WITH URINARY OBSTRUCTION: ICD-10-CM

## 2023-12-18 DIAGNOSIS — N39.41 URGE INCONTINENCE: Primary | ICD-10-CM

## 2023-12-18 PROCEDURE — 1036F TOBACCO NON-USER: CPT | Performed by: STUDENT IN AN ORGANIZED HEALTH CARE EDUCATION/TRAINING PROGRAM

## 2023-12-18 PROCEDURE — 1126F AMNT PAIN NOTED NONE PRSNT: CPT | Performed by: STUDENT IN AN ORGANIZED HEALTH CARE EDUCATION/TRAINING PROGRAM

## 2023-12-18 PROCEDURE — 99214 OFFICE O/P EST MOD 30 MIN: CPT | Performed by: STUDENT IN AN ORGANIZED HEALTH CARE EDUCATION/TRAINING PROGRAM

## 2023-12-18 PROCEDURE — 1160F RVW MEDS BY RX/DR IN RCRD: CPT | Performed by: STUDENT IN AN ORGANIZED HEALTH CARE EDUCATION/TRAINING PROGRAM

## 2023-12-18 PROCEDURE — 1159F MED LIST DOCD IN RCRD: CPT | Performed by: STUDENT IN AN ORGANIZED HEALTH CARE EDUCATION/TRAINING PROGRAM

## 2023-12-18 ASSESSMENT — PAIN SCALES - GENERAL: PAINLEVEL: 0-NO PAIN

## 2023-12-18 NOTE — PROGRESS NOTES
Jody calls today to request appt with Dr. Zavala. It appears she is due for 6 month f/u in June with labs a few days prior, and possible iron infusion. Left a message for return call Kanwal Potter     UROLOGIC FOLLOW-UP VISIT     PROBLEM LIST:  1. Urge incontinence  trospium (Sanctura XR) 60 mg 24 hour capsule    Referral to Physical Therapy      2. Adenocarcinoma of prostate (CMS/HCC)        3. Benign prostatic hyperplasia with urinary obstruction             HISTORY OF PRESENT ILLNESS:   67-year-old male with history of recurrent nephrolithiasis, prostate cancer status post  IMRT  radiation therapy consisting of a dose of 81 Gy, completed July 22, 2009 with stable PSA, BPH and erectile dysfunction.  Patient presents today for follow-up on his obstructive lower urinary tract symptoms.  He was started on Flomax and we doubled the dose at his last appointment patient states he was able to empty his bladder but continues to have a very slow stream and postvoid dribbling as well as urinary hesitancy.  Patient elected to undergo TURP and dilation of urethral stricture on 11/28/2023. Patient has stopped the Flomax and started Trospium due to his incontinence symptoms whish he feels has lessened since surgery.  Patient endorses marked improvement in his urinary flow.  He otherwise denies any hematuria, dysuria, fevers, chills, nausea, vomiting.     PAST MEDICAL HISTORY:  Past Medical History:   Diagnosis Date    Arthritis     BPH (benign prostatic hyperplasia)     ED (erectile dysfunction)     Glaucoma     Gout     Hyperlipidemia     Hypertension     Nephrolithiasis     Prostate cancer (CMS/HCC) 2009    radiation therapy completed 7/22/2009    Renal calculi     TIA (transient ischemic attack)     Vitamin D deficiency        PAST SURGICAL HISTORY:  Past Surgical History:   Procedure Laterality Date    COLONOSCOPY      KNEE ARTHROSCOPY W/ ACL RECONSTRUCTION      MR HEAD ANGIO WO IV CONTRAST  06/25/2020    MR HEAD ANGIO WO IV CONTRAST 6/25/2020 [facility]    MR NECK ANGIO WO IV CONTRAST  06/25/2020    MR NECK ANGIO WO IV CONTRAST 6/25/2020 [facility]    ORIF RADIUS & ULNA FRACTURES           ALLERGIES:   Allergies   Allergen Reactions    Ace Inhibitors Angioedema and Unknown    Hydrochlorothiazide Swelling    Iodinated Contrast Media Unknown    Iodine Other     Gout    Losartan Unknown    Shellfish Derived Unknown and Other     Shellfish-derived Products    Gout        MEDICATIONS:     Current Outpatient Medications:     allopurinol (Zyloprim) 100 mg tablet, Take 1 tablet (100 mg) by mouth 3 times a day., Disp: , Rfl:     amLODIPine (Norvasc) 5 mg tablet, Take 6 mg by mouth once daily., Disp: , Rfl:     aspirin 81 mg EC tablet, Take 1 tablet (81 mg) by mouth once daily., Disp: , Rfl:     colchicine, gout, (Mitigare) 0.6 mg capsule capsule, Take 2 capsules (1.2 mg) by mouth. FOR ONE DOSE THEN REPEAT ANOTHER TAB 1-2 HOURS LATER. MAY REPEAT IN 3 DAYS, Disp: , Rfl:     esomeprazole (NexIUM) 20 mg DR capsule, Take 1 capsule (20 mg) by mouth once daily., Disp: , Rfl:     ibuprofen 400 mg tablet, Take 1 tablet (400 mg) by mouth 3 times a day as needed., Disp: , Rfl:     meloxicam (Mobic) 15 mg tablet, Take 1 tablet (15 mg) by mouth once daily. Take with food, Disp: 30 tablet, Rfl: 11    methocarbamol (Robaxin) 750 mg tablet, Take 1 tablet (750 mg) by mouth 3 times a day as needed for muscle spasms (and pain)., Disp: , Rfl:     tadalafil (Cialis) 20 mg tablet, Take 1 tablet (20 mg) by mouth. 1 hour before activity as needed, Disp: , Rfl:     tamsulosin (Flomax) 0.4 mg 24 hr capsule, Take 1 capsule (0.4 mg) by mouth once daily at bedtime., Disp: , Rfl:     trospium (Sanctura XR) 60 mg 24 hour capsule, Take 1 capsule (60 mg) by mouth once daily in the morning. Take before meals., Disp: 30 capsule, Rfl: 11      SOCIAL HISTORY:  Patient  reports that he has never smoked. He has never used smokeless tobacco. He reports current alcohol use. He reports that he does not use drugs.   Social History     Socioeconomic History    Marital status:      Spouse name: Not on file    Number of children: Not on file     Years of education: Not on file    Highest education level: Not on file   Occupational History    Not on file   Tobacco Use    Smoking status: Never    Smokeless tobacco: Never   Substance and Sexual Activity    Alcohol use: Yes     Comment: socially    Drug use: Never    Sexual activity: Defer   Other Topics Concern    Not on file   Social History Narrative    Not on file     Social Determinants of Health     Financial Resource Strain: Not on file   Food Insecurity: Not on file   Transportation Needs: Not on file   Physical Activity: Not on file   Stress: Not on file   Social Connections: Not on file   Intimate Partner Violence: Not on file   Housing Stability: Not on file       FAMILY HISTORY:  Family History   Problem Relation Name Age of Onset    Prostate cancer Father         REVIEW OF SYSTEMS:  Constitutional: Negative for fever and chills. Denies anorexia, weight loss.  Eyes: Negative for visual disturbance.   Respiratory: Negative for shortness of breath.    Cardiovascular: Negative for chest pain.   Gastrointestinal: Negative for nausea and vomiting.   Genitourinary: See interval history above.  Skin: Negative for rash.   Neurological: Negative for dizziness and numbness.   Psychiatric/Behavioral: Negative for confusion and decreased concentration.     PHYSICAL EXAM:  There were no vitals taken for this visit.  Constitutional: Patient appears well-developed and well-nourished. No distress.    Head: Normocephalic and atraumatic.    Neck: Normal range of motion.    Cardiovascular: Normal rate.    Pulmonary/Chest: Effort normal. No respiratory distress.   Abdominal: soft NTND  Musculoskeletal: Normal range of motion.    Neurological: Alert and oriented to person, place, and time.  Psychiatric: Normal mood and affect. Behavior is normal. Thought content normal.      Lab Results   Component Value Date    BUN 16 11/16/2023    CREATININE 1.31 (H) 11/16/2023    EGFR 60 (L) 11/16/2023     11/16/2023    K 4.7  11/16/2023     (H) 11/16/2023    CO2 26 11/16/2023    CALCIUM 9.8 11/16/2023      Lab Results   Component Value Date    WBC 4.8 11/16/2023    RBC 5.03 11/16/2023    HGB 13.9 11/16/2023    HCT 43.2 11/16/2023    MCV 86 11/16/2023    MCH 27.6 11/16/2023    MCHC 32.2 11/16/2023    RDW 14.1 11/16/2023     11/16/2023        Lab Results   Component Value Date    PSA 0.46 03/12/2023    PSA 0.46 03/12/2023    PSA 1.36 02/14/2023    PSA 0.34 01/03/2023    PSA 0.34 07/26/2022    PSA 0.39 01/25/2022    PSA 0.24 09/28/2021    PSA 0.33 05/13/2021    PSA 0.41 03/30/2021    PSA 0.69 02/28/2020    PSA 0.80 09/10/2019    PSA 0.62 04/05/2019    PSA 0.69 09/28/2018    PSA 1.37 03/16/2018          Assessment:      1. Urge incontinence  trospium (Sanctura XR) 60 mg 24 hour capsule    Referral to Physical Therapy      2. Adenocarcinoma of prostate (CMS/HCC)        3. Benign prostatic hyperplasia with urinary obstruction               Plan:    Counseled patient on the link between urge incontinence and prior radiation    Will continue patient on trospium    Counseled patient on the need to perform kegels and will refer to PFPT   RTC in 1-2 mo for sx reassessment    30 minutes total spent on patient's care today; >50% time spent on counseling/coordination of care

## 2023-12-21 ENCOUNTER — TELEPHONE (OUTPATIENT)
Dept: UROLOGY | Facility: HOSPITAL | Age: 68
End: 2023-12-21
Payer: MEDICARE

## 2023-12-21 NOTE — TELEPHONE ENCOUNTER
Patient called and left message in regards to his urinary symptoms. I called patient back to discuss his urinary symptoms. He began to have complaints of urinary incontinence this morning with no urge present. He also had symptoms of incontinence with no urge with position changes. These symptoms started this morning. He was not experiencing these symptoms last night. I will inform Dr. Wilhelm of this matter. Patient will also be scheduled for pelvic floor physical therapy. Patient will monica me back tomorrow morning with a symptoms update.

## 2024-01-05 DIAGNOSIS — I10 ESSENTIAL HYPERTENSION: Primary | ICD-10-CM

## 2024-01-08 RX ORDER — AMLODIPINE BESYLATE 10 MG/1
10 TABLET ORAL DAILY
Qty: 90 TABLET | Refills: 3 | Status: SHIPPED | OUTPATIENT
Start: 2024-01-08

## 2024-01-10 ASSESSMENT — ENCOUNTER SYMPTOMS
GASTROINTESTINAL NEGATIVE: 1
RESPIRATORY NEGATIVE: 1
CARDIOVASCULAR NEGATIVE: 1
DYSURIA: 0
EYES NEGATIVE: 1
ENDOCRINE NEGATIVE: 1
CONSTITUTIONAL NEGATIVE: 1

## 2024-01-10 NOTE — H&P
History Of Present Illness  Arthur Harley is a 68 y.o. male presenting with hx of prostate cancer s/p radiation and obstructive LUTS who presents today for TURP.     Past Medical History  Past Medical History:   Diagnosis Date    Arthritis     BPH (benign prostatic hyperplasia)     ED (erectile dysfunction)     Glaucoma     Gout     Hyperlipidemia     Hypertension     Nephrolithiasis     Prostate cancer (CMS/HCC) 2009    radiation therapy completed 7/22/2009    Renal calculi     TIA (transient ischemic attack)     Vitamin D deficiency        Surgical History  Past Surgical History:   Procedure Laterality Date    COLONOSCOPY      KNEE ARTHROSCOPY W/ ACL RECONSTRUCTION      MR HEAD ANGIO WO IV CONTRAST  06/25/2020    MR HEAD ANGIO WO IV CONTRAST 6/25/2020 [facility]    MR NECK ANGIO WO IV CONTRAST  06/25/2020    MR NECK ANGIO WO IV CONTRAST 6/25/2020 [facility]    ORIF RADIUS & ULNA FRACTURES          Social History  He reports that he has never smoked. He has never used smokeless tobacco. He reports current alcohol use. He reports that he does not use drugs.    Family History  Family History   Problem Relation Name Age of Onset    Prostate cancer Father          Allergies  Ace inhibitors, Hydrochlorothiazide, Iodinated contrast media, Iodine, Losartan, and Shellfish derived    Review of Systems   Constitutional: Negative.    HENT: Negative.     Eyes: Negative.    Respiratory: Negative.     Cardiovascular: Negative.    Gastrointestinal: Negative.    Endocrine: Negative.    Genitourinary: Negative.  Negative for dysuria.        Physical Exam  Vitals reviewed.   Constitutional:       Appearance: Normal appearance.   HENT:      Head: Normocephalic and atraumatic.   Cardiovascular:      Rate and Rhythm: Normal rate and regular rhythm.   Abdominal:      General: There is no distension.      Palpations: Abdomen is soft.      Tenderness: There is no abdominal tenderness.   Musculoskeletal:       "Cervical back: Normal range of motion and neck supple.   Neurological:      Mental Status: He is alert.          Last Recorded Vitals  Blood pressure 146/90, pulse 63, temperature 36 °C (96.8 °F), temperature source Temporal, resp. rate 13, height 2.007 m (6' 7\"), weight 97.5 kg (214 lb 15.2 oz), SpO2 99 %.    Relevant Results             Assessment/Plan   Principal Problem:    BPH (benign prostatic hyperplasia)  Will proceed with TURP      Reyes Wilhelm MD    "

## 2024-01-10 NOTE — PROGRESS NOTES
UROLOGIC FOLLOW-UP VISIT     PROBLEM LIST:    1. Urge incontinence  Measure post void residual      2. Vasculogenic erectile dysfunction, unspecified vasculogenic erectile dysfunction type        3. Benign prostatic hyperplasia with urinary obstruction        4. Adenocarcinoma of prostate (CMS/HCC)             HISTORY OF PRESENT ILLNESS:   67-year-old male with history of recurrent nephrolithiasis, prostate cancer status post IMRT radiation therapy consisting of a dose of 81 Gy, completed July 22, 2009 with stable PSA, BPH and erectile dysfunction.   He was started on Flomax and we doubled the dose and states he was able to empty his bladder but continued to have a very slow stream and postvoid dribbling as well as urinary hesitancy and intermittent urinary incontinence.  After extensive discussion patient elected to undergo transurethral resection of the prostate.  The surgery was performed on November 28, 2023 and at that time patient was noted to have a dense bulbar urethral stricture.  The stricture was dilated and a TURP was performed resecting a modest amount of prostate tissue.      Postoperatively patient states his urinary flow is much improved however he had symptoms consisting of urge incontinence postoperatively.  He was started on trospium and discontinued his Flomax which improved his urinary leakage.  However patient states today he has urinary leakage now with changing of positions and occasionally with lifting objects.  He states he wears protective undergarments and not padded so he is unable to quantify how many pads a day he requires.  Patient states that his leakage has improved since surgery however he can still use that bothersome urinary leakage.  He denies any dysuria, hematuria, fevers, chills, nausea, vomiting. PVR today was 0cc He otherwise denies any urgency, frequency, hematuria, dysuria, fevers, chills, nausea, vomiting.     PAST MEDICAL HISTORY:  Past Medical History:   Diagnosis  Date    Arthritis     BPH (benign prostatic hyperplasia)     ED (erectile dysfunction)     Glaucoma     Gout     Hyperlipidemia     Hypertension     Nephrolithiasis     Prostate cancer (CMS/HCC) 2009    radiation therapy completed 7/22/2009    Renal calculi     TIA (transient ischemic attack)     Vitamin D deficiency        PAST SURGICAL HISTORY:  Past Surgical History:   Procedure Laterality Date    COLONOSCOPY      KNEE ARTHROSCOPY W/ ACL RECONSTRUCTION      MR HEAD ANGIO WO IV CONTRAST  06/25/2020    MR HEAD ANGIO WO IV CONTRAST 6/25/2020 [facility]    MR NECK ANGIO WO IV CONTRAST  06/25/2020    MR NECK ANGIO WO IV CONTRAST 6/25/2020 [facility]    ORIF RADIUS & ULNA FRACTURES          ALLERGIES:   Allergies   Allergen Reactions    Ace Inhibitors Angioedema and Unknown    Hydrochlorothiazide Swelling    Iodinated Contrast Media Unknown    Iodine Other     Gout    Losartan Unknown    Shellfish Derived Unknown and Other     Shellfish-derived Products    Gout        MEDICATIONS:     Current Outpatient Medications:     allopurinol (Zyloprim) 100 mg tablet, Take 1 tablet (100 mg) by mouth 3 times a day., Disp: , Rfl:     amLODIPine (Norvasc) 10 mg tablet, TAKE 1 TABLET BY MOUTH EVERY DAY FOR BLOOD PRESSURE, Disp: 90 tablet, Rfl: 3    amLODIPine (Norvasc) 5 mg tablet, Take 6 mg by mouth once daily., Disp: , Rfl:     aspirin 81 mg EC tablet, Take 1 tablet (81 mg) by mouth once daily., Disp: , Rfl:     colchicine, gout, (Mitigare) 0.6 mg capsule capsule, Take 2 capsules (1.2 mg) by mouth. FOR ONE DOSE THEN REPEAT ANOTHER TAB 1-2 HOURS LATER. MAY REPEAT IN 3 DAYS, Disp: , Rfl:     esomeprazole (NexIUM) 20 mg DR capsule, Take 1 capsule (20 mg) by mouth once daily., Disp: , Rfl:     ibuprofen 400 mg tablet, Take 1 tablet (400 mg) by mouth 3 times a day as needed., Disp: , Rfl:     meloxicam (Mobic) 15 mg tablet, Take 1 tablet (15 mg) by mouth once daily. Take with food, Disp: 30 tablet, Rfl: 11     methocarbamol (Robaxin) 750 mg tablet, Take 1 tablet (750 mg) by mouth 3 times a day as needed for muscle spasms (and pain)., Disp: , Rfl:     tadalafil (Cialis) 20 mg tablet, Take 1 tablet (20 mg) by mouth. 1 hour before activity as needed, Disp: , Rfl:     tamsulosin (Flomax) 0.4 mg 24 hr capsule, Take 1 capsule (0.4 mg) by mouth once daily at bedtime., Disp: , Rfl:     trospium (Sanctura XR) 60 mg 24 hour capsule, Take 1 capsule (60 mg) by mouth once daily in the morning. Take before meals., Disp: 30 capsule, Rfl: 11      SOCIAL HISTORY:  Patient  reports that he has never smoked. He has never used smokeless tobacco. He reports current alcohol use. He reports that he does not use drugs.   Social History     Socioeconomic History    Marital status:      Spouse name: Not on file    Number of children: Not on file    Years of education: Not on file    Highest education level: Not on file   Occupational History    Not on file   Tobacco Use    Smoking status: Never    Smokeless tobacco: Never   Substance and Sexual Activity    Alcohol use: Yes     Comment: socially    Drug use: Never    Sexual activity: Defer   Other Topics Concern    Not on file   Social History Narrative    Not on file     Social Determinants of Health     Financial Resource Strain: Not on file   Food Insecurity: Not on file   Transportation Needs: Not on file   Physical Activity: Not on file   Stress: Not on file   Social Connections: Not on file   Intimate Partner Violence: Not on file   Housing Stability: Not on file       FAMILY HISTORY:  Family History   Problem Relation Name Age of Onset    Prostate cancer Father         REVIEW OF SYSTEMS:  Constitutional: Negative for fever and chills. Denies anorexia, weight loss.  Eyes: Negative for visual disturbance.   Respiratory: Negative for shortness of breath.    Cardiovascular: Negative for chest pain.   Gastrointestinal: Negative for nausea and vomiting.   Genitourinary: See interval  history above.  Skin: Negative for rash.   Neurological: Negative for dizziness and numbness.   Psychiatric/Behavioral: Negative for confusion and decreased concentration.     PHYSICAL EXAM:  There were no vitals taken for this visit.  Constitutional: Patient appears well-developed and well-nourished. No distress.    Head: Normocephalic and atraumatic.    Neck: Normal range of motion.    Cardiovascular: Normal rate.    Pulmonary/Chest: Effort normal. No respiratory distress.   Abdominal: soft NTND  Musculoskeletal: Normal range of motion.    Neurological: Alert and oriented to person, place, and time.  Psychiatric: Normal mood and affect. Behavior is normal. Thought content normal.      Lab Results   Component Value Date    BUN 16 11/16/2023    CREATININE 1.31 (H) 11/16/2023    EGFR 60 (L) 11/16/2023     11/16/2023    K 4.7 11/16/2023     (H) 11/16/2023    CO2 26 11/16/2023    CALCIUM 9.8 11/16/2023      Lab Results   Component Value Date    WBC 4.8 11/16/2023    RBC 5.03 11/16/2023    HGB 13.9 11/16/2023    HCT 43.2 11/16/2023    MCV 86 11/16/2023    MCH 27.6 11/16/2023    MCHC 32.2 11/16/2023    RDW 14.1 11/16/2023     11/16/2023        Lab Results   Component Value Date    PSA 0.46 03/12/2023    PSA 0.46 03/12/2023    PSA 1.36 02/14/2023    PSA 0.34 01/03/2023    PSA 0.34 07/26/2022    PSA 0.39 01/25/2022    PSA 0.24 09/28/2021    PSA 0.33 05/13/2021    PSA 0.41 03/30/2021    PSA 0.69 02/28/2020    PSA 0.80 09/10/2019    PSA 0.62 04/05/2019    PSA 0.69 09/28/2018    PSA 1.37 03/16/2018           Assessment:      1. Urge incontinence  Measure post void residual      2. Vasculogenic erectile dysfunction, unspecified vasculogenic erectile dysfunction type        3. Benign prostatic hyperplasia with urinary obstruction        4. Adenocarcinoma of prostate (CMS/HCC)          Plan:   Discussed in detail with patient that based off his preoperative symptoms it is likely that his urethral stricture was  minimizing his urinary incontinence and after dilation the stricture no longer provides resistance to prevent leakage  We reviewed the physiology of normal voiding and the role of the sphincter muscle and preventing urinary leakage and the importance of pelvic floor physical therapy.  Patient would like to perform Kegel exercises at home to help strengthen his pelvic floor and if this does not improve then proceeding to pelvic floor physical therapy  We will continue patient on trospium and evaluate his urinary symptoms in 1 month    32 minutes total spent on patient's care today; >50% time spent on counseling/coordination of care

## 2024-01-15 ENCOUNTER — OFFICE VISIT (OUTPATIENT)
Dept: UROLOGY | Facility: HOSPITAL | Age: 69
End: 2024-01-15
Payer: MEDICARE

## 2024-01-15 DIAGNOSIS — N39.41 URGE INCONTINENCE: Primary | ICD-10-CM

## 2024-01-15 DIAGNOSIS — C61 ADENOCARCINOMA OF PROSTATE (MULTI): ICD-10-CM

## 2024-01-15 DIAGNOSIS — N40.1 BENIGN PROSTATIC HYPERPLASIA WITH URINARY OBSTRUCTION: ICD-10-CM

## 2024-01-15 DIAGNOSIS — N13.8 BENIGN PROSTATIC HYPERPLASIA WITH URINARY OBSTRUCTION: ICD-10-CM

## 2024-01-15 DIAGNOSIS — N52.9 VASCULOGENIC ERECTILE DYSFUNCTION, UNSPECIFIED VASCULOGENIC ERECTILE DYSFUNCTION TYPE: ICD-10-CM

## 2024-01-15 PROCEDURE — 1126F AMNT PAIN NOTED NONE PRSNT: CPT | Performed by: STUDENT IN AN ORGANIZED HEALTH CARE EDUCATION/TRAINING PROGRAM

## 2024-01-15 PROCEDURE — 99214 OFFICE O/P EST MOD 30 MIN: CPT | Performed by: STUDENT IN AN ORGANIZED HEALTH CARE EDUCATION/TRAINING PROGRAM

## 2024-01-15 PROCEDURE — 1160F RVW MEDS BY RX/DR IN RCRD: CPT | Performed by: STUDENT IN AN ORGANIZED HEALTH CARE EDUCATION/TRAINING PROGRAM

## 2024-01-15 PROCEDURE — 1036F TOBACCO NON-USER: CPT | Performed by: STUDENT IN AN ORGANIZED HEALTH CARE EDUCATION/TRAINING PROGRAM

## 2024-02-13 NOTE — PROGRESS NOTES
UROLOGIC FOLLOW-UP VISIT     PROBLEM LIST:  1. Urge incontinence        2. Adenocarcinoma of prostate (CMS/HCC)        3. Benign prostatic hyperplasia with urinary obstruction        4. Vasculogenic erectile dysfunction, unspecified vasculogenic erectile dysfunction type             HISTORY OF PRESENT ILLNESS:   67-year-old male with history of recurrent nephrolithiasis, prostate cancer status post IMRT radiation therapy consisting of a dose of 81 Gy, completed July 22, 2009 with stable PSA, BPH and erectile dysfunction.   He was started on Flomax and we doubled the dose and states he was able to empty his bladder but continued to have a very slow stream and postvoid dribbling as well as urinary hesitancy and intermittent urinary incontinence.  After extensive discussion patient elected to undergo transurethral resection of the prostate.  The surgery was performed on November 28, 2023 and at that time patient was noted to have a dense bulbar urethral stricture.  The stricture was dilated and a TURP was performed resecting a modest amount of prostate tissue.     Postoperatively patient states his urinary flow is much improved however he had symptoms consisting of urge incontinence postoperatively.  He was started on trospium and discontinued his Flomax which improved his urinary leakage.  However patient states today he has urinary leakage now with changing of positions and occasionally with lifting objects.  He states he wears protective undergarments and not padded so he is unable to quantify how many pads a day he requires.  Patient states that his leakage has improved since surgery however he can still use that bothersome urinary leakage.  He denies any dysuria, hematuria, fevers, chills, nausea, vomiting. PVR today was 0cc He otherwise denies any urgency, frequency, hematuria, dysuria, fevers, chills, nausea, vomiting.     PAST MEDICAL HISTORY:  Past Medical History:   Diagnosis Date    Arthritis     BPH  (benign prostatic hyperplasia)     ED (erectile dysfunction)     Glaucoma     Gout     Hyperlipidemia     Hypertension     Nephrolithiasis     Prostate cancer (CMS/HCC) 2009    radiation therapy completed 7/22/2009    Renal calculi     TIA (transient ischemic attack)     Vitamin D deficiency        PAST SURGICAL HISTORY:  Past Surgical History:   Procedure Laterality Date    COLONOSCOPY      KNEE ARTHROSCOPY W/ ACL RECONSTRUCTION      MR HEAD ANGIO WO IV CONTRAST  06/25/2020    MR HEAD ANGIO WO IV CONTRAST 6/25/2020 [facility]    MR NECK ANGIO WO IV CONTRAST  06/25/2020    MR NECK ANGIO WO IV CONTRAST 6/25/2020 [facility]    ORIF RADIUS & ULNA FRACTURES          ALLERGIES:   Allergies   Allergen Reactions    Ace Inhibitors Angioedema and Unknown    Hydrochlorothiazide Swelling    Iodinated Contrast Media Unknown    Iodine Other     Gout    Losartan Unknown    Shellfish Derived Unknown and Other     Shellfish-derived Products    Gout        MEDICATIONS:     Current Outpatient Medications:     allopurinol (Zyloprim) 100 mg tablet, Take 1 tablet (100 mg) by mouth 3 times a day., Disp: , Rfl:     amLODIPine (Norvasc) 10 mg tablet, TAKE 1 TABLET BY MOUTH EVERY DAY FOR BLOOD PRESSURE, Disp: 90 tablet, Rfl: 3    amLODIPine (Norvasc) 5 mg tablet, Take 6 mg by mouth once daily., Disp: , Rfl:     aspirin 81 mg EC tablet, Take 1 tablet (81 mg) by mouth once daily., Disp: , Rfl:     colchicine, gout, (Mitigare) 0.6 mg capsule capsule, Take 2 capsules (1.2 mg) by mouth. FOR ONE DOSE THEN REPEAT ANOTHER TAB 1-2 HOURS LATER. MAY REPEAT IN 3 DAYS, Disp: , Rfl:     esomeprazole (NexIUM) 20 mg DR capsule, Take 1 capsule (20 mg) by mouth once daily., Disp: , Rfl:     ibuprofen 400 mg tablet, Take 1 tablet (400 mg) by mouth 3 times a day as needed., Disp: , Rfl:     meloxicam (Mobic) 15 mg tablet, Take 1 tablet (15 mg) by mouth once daily. Take with food, Disp: 30 tablet, Rfl: 11    methocarbamol (Robaxin) 750 mg  tablet, Take 1 tablet (750 mg) by mouth 3 times a day as needed for muscle spasms (and pain)., Disp: , Rfl:     tadalafil (Cialis) 20 mg tablet, Take 1 tablet (20 mg) by mouth. 1 hour before activity as needed, Disp: , Rfl:     tamsulosin (Flomax) 0.4 mg 24 hr capsule, Take 1 capsule (0.4 mg) by mouth once daily at bedtime., Disp: , Rfl:     trospium (Sanctura XR) 60 mg 24 hour capsule, Take 1 capsule (60 mg) by mouth once daily in the morning. Take before meals., Disp: 30 capsule, Rfl: 11      SOCIAL HISTORY:  Patient  reports that he has never smoked. He has never used smokeless tobacco. He reports current alcohol use. He reports that he does not use drugs.   Social History     Socioeconomic History    Marital status:      Spouse name: Not on file    Number of children: Not on file    Years of education: Not on file    Highest education level: Not on file   Occupational History    Not on file   Tobacco Use    Smoking status: Never    Smokeless tobacco: Never   Substance and Sexual Activity    Alcohol use: Yes     Comment: socially    Drug use: Never    Sexual activity: Defer   Other Topics Concern    Not on file   Social History Narrative    Not on file     Social Determinants of Health     Financial Resource Strain: Not on file   Food Insecurity: Not on file   Transportation Needs: Not on file   Physical Activity: Not on file   Stress: Not on file   Social Connections: Not on file   Intimate Partner Violence: Not on file   Housing Stability: Not on file       FAMILY HISTORY:  Family History   Problem Relation Name Age of Onset    Prostate cancer Father         REVIEW OF SYSTEMS:***  Constitutional: Negative for fever and chills. Denies anorexia, weight loss.  Eyes: Negative for visual disturbance.   Respiratory: Negative for shortness of breath.    Cardiovascular: Negative for chest pain.   Gastrointestinal: Negative for nausea and vomiting.   Genitourinary: See interval history above.  Skin: Negative for  rash.   Neurological: Negative for dizziness and numbness.   Psychiatric/Behavioral: Negative for confusion and decreased concentration.     PHYSICAL EXAM:  There were no vitals taken for this visit.  Constitutional: Patient appears well-developed and well-nourished. No distress.    Head: Normocephalic and atraumatic.    Neck: Normal range of motion.    Cardiovascular: Normal rate.    Pulmonary/Chest: Effort normal. No respiratory distress.   Abdominal: ***  : ***  Musculoskeletal: Normal range of motion.    Neurological: Alert and oriented to person, place, and time.  Psychiatric: Normal mood and affect. Behavior is normal. Thought content normal.      PATHOLOGY REVIEW:  ***    RADIOLOGY REVIEW:  [unfilled]    LABORATORY REVIEW:     Lab Results   Component Value Date    BUN 16 11/16/2023    CREATININE 1.31 (H) 11/16/2023    EGFR 60 (L) 11/16/2023     11/16/2023    K 4.7 11/16/2023     (H) 11/16/2023    CO2 26 11/16/2023    CALCIUM 9.8 11/16/2023      Lab Results   Component Value Date    WBC 4.8 11/16/2023    RBC 5.03 11/16/2023    HGB 13.9 11/16/2023    HCT 43.2 11/16/2023    MCV 86 11/16/2023    MCH 27.6 11/16/2023    MCHC 32.2 11/16/2023    RDW 14.1 11/16/2023     11/16/2023        Lab Results   Component Value Date    PSA 0.46 03/12/2023    PSA 0.46 03/12/2023    PSA 1.36 02/14/2023    PSA 0.34 01/03/2023    PSA 0.34 07/26/2022    PSA 0.39 01/25/2022    PSA 0.24 09/28/2021    PSA 0.33 05/13/2021    PSA 0.41 03/30/2021    PSA 0.69 02/28/2020    PSA 0.80 09/10/2019    PSA 0.62 04/05/2019    PSA 0.69 09/28/2018    PSA 1.37 03/16/2018        *** Urine dipstick shows {ua dip:[id number]}.  Micro exam: {urine micro:[id number]}.       Assessment:      1. Urge incontinence        2. Adenocarcinoma of prostate (CMS/HCC)        3. Benign prostatic hyperplasia with urinary obstruction        4. Vasculogenic erectile dysfunction, unspecified vasculogenic erectile dysfunction type            Arthur Sarmiento  Cherri is a 68 y.o. male with ***     Plan:    ***    * _ minutes total spent on patient's care today; >50% time spent on counseling/coordination of care

## 2024-02-15 DIAGNOSIS — M48.061 SPINAL STENOSIS OF LUMBAR REGION, UNSPECIFIED WHETHER NEUROGENIC CLAUDICATION PRESENT: Primary | ICD-10-CM

## 2024-02-15 DIAGNOSIS — S33.5XXD LUMBAR SPRAIN, SUBSEQUENT ENCOUNTER: ICD-10-CM

## 2024-02-15 RX ORDER — METHOCARBAMOL 750 MG/1
TABLET, FILM COATED ORAL
Qty: 60 TABLET | Refills: 2 | Status: SHIPPED | OUTPATIENT
Start: 2024-02-15

## 2024-02-19 ENCOUNTER — APPOINTMENT (OUTPATIENT)
Dept: UROLOGY | Facility: HOSPITAL | Age: 69
End: 2024-02-19
Payer: MEDICARE

## 2024-02-28 NOTE — PROGRESS NOTES
UROLOGIC FOLLOW-UP VISIT     PROBLEM LIST:  1. No diagnosis found.     HISTORY OF PRESENT ILLNESS:   67-year-old male with history of recurrent nephrolithiasis, prostate cancer status post IMRT radiation therapy consisting of a dose of 81 Gy, completed July 22, 2009 with stable PSA, BPH and erectile dysfunction.   He was started on Flomax and we doubled the dose and states he was able to empty his bladder but continued to have a very slow stream and postvoid dribbling as well as urinary hesitancy and intermittent urinary incontinence.  After extensive discussion patient elected to undergo transurethral resection of the prostate.  The surgery was performed on November 28, 2023 and at that time patient was noted to have a dense bulbar urethral stricture.  The stricture was dilated and a TURP was performed resecting a modest amount of prostate tissue.       Postoperatively patient states his urinary flow is much improved however he had symptoms consisting of urge incontinence postoperatively.  He was started on trospium and discontinued his Flomax which improved his urinary leakage.  However patient states today he has urinary leakage now with changing of positions and occasionally with lifting objects.  He states he wears protective undergarments and not padded so he is unable to quantify how many pads a day he requires.  Patient states that his leakage has improved since surgery however he can still use that bothersome urinary leakage.  He denies any dysuria, hematuria, fevers, chills, nausea, vomiting. PVR today was 0cc He otherwise denies any urgency, frequency, hematuria, dysuria, fevers, chills, nausea, vomiting.     PAST MEDICAL HISTORY:  Past Medical History:   Diagnosis Date    Arthritis     BPH (benign prostatic hyperplasia)     ED (erectile dysfunction)     Glaucoma     Gout     Hyperlipidemia     Hypertension     Nephrolithiasis     Prostate cancer (CMS/[facility]) 2009    radiation therapy completed 7/22/2009     Renal calculi     TIA (transient ischemic attack)     Vitamin D deficiency        PAST SURGICAL HISTORY:  Past Surgical History:   Procedure Laterality Date    COLONOSCOPY      KNEE ARTHROSCOPY W/ ACL RECONSTRUCTION      MR HEAD ANGIO WO IV CONTRAST  06/25/2020    MR HEAD ANGIO WO IV CONTRAST 6/25/2020 [facility]    MR NECK ANGIO WO IV CONTRAST  06/25/2020    MR NECK ANGIO WO IV CONTRAST 6/25/2020 [facility]    ORIF RADIUS & ULNA FRACTURES          ALLERGIES:   Allergies   Allergen Reactions    Ace Inhibitors Angioedema and Unknown    Hydrochlorothiazide Swelling    Iodinated Contrast Media Unknown    Iodine Other     Gout    Losartan Unknown    Shellfish Derived Unknown and Other     Shellfish-derived Products    Gout        MEDICATIONS:     Current Outpatient Medications:     allopurinol (Zyloprim) 100 mg tablet, Take 1 tablet (100 mg) by mouth 3 times a day., Disp: , Rfl:     amLODIPine (Norvasc) 10 mg tablet, TAKE 1 TABLET BY MOUTH EVERY DAY FOR BLOOD PRESSURE, Disp: 90 tablet, Rfl: 3    amLODIPine (Norvasc) 5 mg tablet, Take 6 mg by mouth once daily., Disp: , Rfl:     aspirin 81 mg EC tablet, Take 1 tablet (81 mg) by mouth once daily., Disp: , Rfl:     colchicine, gout, (Mitigare) 0.6 mg capsule capsule, Take 2 capsules (1.2 mg) by mouth. FOR ONE DOSE THEN REPEAT ANOTHER TAB 1-2 HOURS LATER. MAY REPEAT IN 3 DAYS, Disp: , Rfl:     esomeprazole (NexIUM) 20 mg DR capsule, Take 1 capsule (20 mg) by mouth once daily., Disp: , Rfl:     ibuprofen 400 mg tablet, Take 1 tablet (400 mg) by mouth 3 times a day as needed., Disp: , Rfl:     meloxicam (Mobic) 15 mg tablet, Take 1 tablet (15 mg) by mouth once daily. Take with food, Disp: 30 tablet, Rfl: 11    methocarbamol (Robaxin) 750 mg tablet, TAKE 1 TABLET 3 TIMES DAILY AS NEEDED FOR PAIN AND SPASMS, Disp: 60 tablet, Rfl: 2    tadalafil (Cialis) 20 mg tablet, Take 1 tablet (20 mg) by mouth. 1 hour before activity as needed, Disp: , Rfl:      tamsulosin (Flomax) 0.4 mg 24 hr capsule, Take 1 capsule (0.4 mg) by mouth once daily at bedtime., Disp: , Rfl:     trospium (Sanctura XR) 60 mg 24 hour capsule, Take 1 capsule (60 mg) by mouth once daily in the morning. Take before meals., Disp: 30 capsule, Rfl: 11      SOCIAL HISTORY:  Patient  reports that he has never smoked. He has never used smokeless tobacco. He reports current alcohol use. He reports that he does not use drugs.   Social History     Socioeconomic History    Marital status:      Spouse name: Not on file    Number of children: Not on file    Years of education: Not on file    Highest education level: Not on file   Occupational History    Not on file   Tobacco Use    Smoking status: Never    Smokeless tobacco: Never   Substance and Sexual Activity    Alcohol use: Yes     Comment: socially    Drug use: Never    Sexual activity: Defer   Other Topics Concern    Not on file   Social History Narrative    Not on file     Social Determinants of Health     Financial Resource Strain: Not on file   Food Insecurity: Not on file   Transportation Needs: Not on file   Physical Activity: Not on file   Stress: Not on file   Social Connections: Not on file   Intimate Partner Violence: Not on file   Housing Stability: Not on file       FAMILY HISTORY:  Family History   Problem Relation Name Age of Onset    Prostate cancer Father         REVIEW OF SYSTEMS:***  Constitutional: Negative for fever and chills. Denies anorexia, weight loss.  Eyes: Negative for visual disturbance.   Respiratory: Negative for shortness of breath.    Cardiovascular: Negative for chest pain.   Gastrointestinal: Negative for nausea and vomiting.   Genitourinary: See interval history above.  Skin: Negative for rash.   Neurological: Negative for dizziness and numbness.   Psychiatric/Behavioral: Negative for confusion and decreased concentration.     PHYSICAL EXAM:  There were no vitals taken for this visit.  Constitutional: Patient  appears well-developed and well-nourished. No distress.    Head: Normocephalic and atraumatic.    Neck: Normal range of motion.    Cardiovascular: Normal rate.    Pulmonary/Chest: Effort normal. No respiratory distress.   Abdominal: ***  : ***  Musculoskeletal: Normal range of motion.    Neurological: Alert and oriented to person, place, and time.  Psychiatric: Normal mood and affect. Behavior is normal. Thought content normal.      PATHOLOGY REVIEW:  ***    RADIOLOGY REVIEW:  [unfilled]    LABORATORY REVIEW:     Lab Results   Component Value Date    BUN 16 11/16/2023    CREATININE 1.31 (H) 11/16/2023    EGFR 60 (L) 11/16/2023     11/16/2023    K 4.7 11/16/2023     (H) 11/16/2023    CO2 26 11/16/2023    CALCIUM 9.8 11/16/2023      Lab Results   Component Value Date    WBC 4.8 11/16/2023    RBC 5.03 11/16/2023    HGB 13.9 11/16/2023    HCT 43.2 11/16/2023    MCV 86 11/16/2023    MCH 27.6 11/16/2023    MCHC 32.2 11/16/2023    RDW 14.1 11/16/2023     11/16/2023        Lab Results   Component Value Date    PSA 0.46 03/12/2023    PSA 0.46 03/12/2023    PSA 1.36 02/14/2023    PSA 0.34 01/03/2023    PSA 0.34 07/26/2022    PSA 0.39 01/25/2022    PSA 0.24 09/28/2021    PSA 0.33 05/13/2021    PSA 0.41 03/30/2021    PSA 0.69 02/28/2020    PSA 0.80 09/10/2019    PSA 0.62 04/05/2019    PSA 0.69 09/28/2018    PSA 1.37 03/16/2018        *** Urine dipstick shows {ua dip:596729}.  Micro exam: {urine micro:102425}.       Assessment:    No diagnosis found.    Arthur Harley is a 68 y.o. male with ***     Plan:    ***    * _ minutes total spent on patient's care today; >50% time spent on counseling/coordination of care

## 2024-03-04 ENCOUNTER — APPOINTMENT (OUTPATIENT)
Dept: UROLOGY | Facility: HOSPITAL | Age: 69
End: 2024-03-04
Payer: MEDICARE

## 2024-11-21 RX ORDER — MELOXICAM 15 MG/1
15 TABLET ORAL DAILY
COMMUNITY

## 2024-11-21 RX ORDER — MELOXICAM 15 MG/1
15 TABLET ORAL DAILY
Qty: 30 TABLET | Refills: 11 | OUTPATIENT
Start: 2024-11-21

## 2024-11-21 NOTE — TELEPHONE ENCOUNTER
Please advise refill request was not currently in his med list?  Due to age are you going to fill I am unsure if this is a VIP patient? 30 D with 11 refills?

## 2024-12-05 DIAGNOSIS — E55.9 VITAMIN D DEFICIENCY: ICD-10-CM

## 2024-12-05 DIAGNOSIS — I25.10 ARTERIOSCLEROTIC CORONARY ARTERY DISEASE: ICD-10-CM

## 2024-12-05 DIAGNOSIS — M1A.09X0 CHRONIC PRIMARY GOUT OF MULTIPLE SITES WITHOUT TOPHUS: ICD-10-CM

## 2024-12-05 DIAGNOSIS — I10 ESSENTIAL HYPERTENSION: ICD-10-CM

## 2024-12-05 DIAGNOSIS — K57.30 DIVERTICULA OF COLON: ICD-10-CM

## 2024-12-05 DIAGNOSIS — R35.1 BENIGN PROSTATIC HYPERPLASIA WITH NOCTURIA: ICD-10-CM

## 2024-12-05 DIAGNOSIS — C61 ADENOCARCINOMA OF PROSTATE (MULTI): Primary | ICD-10-CM

## 2024-12-05 DIAGNOSIS — N52.01 ERECTILE DYSFUNCTION DUE TO ARTERIAL INSUFFICIENCY: ICD-10-CM

## 2024-12-05 DIAGNOSIS — E78.5 DYSLIPIDEMIA: ICD-10-CM

## 2024-12-05 DIAGNOSIS — N40.1 BENIGN PROSTATIC HYPERPLASIA WITH NOCTURIA: ICD-10-CM

## 2024-12-05 DIAGNOSIS — G45.9 TIA (TRANSIENT ISCHEMIC ATTACK): ICD-10-CM

## 2024-12-07 ENCOUNTER — LAB (OUTPATIENT)
Dept: LAB | Facility: LAB | Age: 69
End: 2024-12-07
Payer: MEDICARE

## 2024-12-07 DIAGNOSIS — M1A.09X0 CHRONIC PRIMARY GOUT OF MULTIPLE SITES WITHOUT TOPHUS: ICD-10-CM

## 2024-12-07 DIAGNOSIS — C61 ADENOCARCINOMA OF PROSTATE (MULTI): ICD-10-CM

## 2024-12-07 DIAGNOSIS — E55.9 VITAMIN D DEFICIENCY: ICD-10-CM

## 2024-12-07 DIAGNOSIS — N40.1 BENIGN PROSTATIC HYPERPLASIA WITH NOCTURIA: ICD-10-CM

## 2024-12-07 DIAGNOSIS — E78.5 DYSLIPIDEMIA: ICD-10-CM

## 2024-12-07 DIAGNOSIS — K57.30 DIVERTICULA OF COLON: ICD-10-CM

## 2024-12-07 DIAGNOSIS — I25.10 ARTERIOSCLEROTIC CORONARY ARTERY DISEASE: ICD-10-CM

## 2024-12-07 DIAGNOSIS — N52.01 ERECTILE DYSFUNCTION DUE TO ARTERIAL INSUFFICIENCY: ICD-10-CM

## 2024-12-07 DIAGNOSIS — G45.9 TIA (TRANSIENT ISCHEMIC ATTACK): ICD-10-CM

## 2024-12-07 DIAGNOSIS — R35.1 BENIGN PROSTATIC HYPERPLASIA WITH NOCTURIA: ICD-10-CM

## 2024-12-07 DIAGNOSIS — I10 ESSENTIAL HYPERTENSION: ICD-10-CM

## 2024-12-07 LAB
ALBUMIN SERPL BCP-MCNC: 4.5 G/DL (ref 3.4–5)
ALP SERPL-CCNC: 56 U/L (ref 33–136)
ALT SERPL W P-5'-P-CCNC: 13 U/L (ref 10–52)
ANION GAP SERPL CALC-SCNC: 13 MMOL/L (ref 10–20)
APPEARANCE UR: CLEAR
AST SERPL W P-5'-P-CCNC: 14 U/L (ref 9–39)
BASOPHILS # BLD AUTO: 0.04 X10*3/UL (ref 0–0.1)
BASOPHILS NFR BLD AUTO: 0.9 %
BILIRUB SERPL-MCNC: 0.7 MG/DL (ref 0–1.2)
BILIRUB UR STRIP.AUTO-MCNC: NEGATIVE MG/DL
BUN SERPL-MCNC: 17 MG/DL (ref 6–23)
CALCIUM SERPL-MCNC: 10.1 MG/DL (ref 8.6–10.6)
CHLORIDE SERPL-SCNC: 108 MMOL/L (ref 98–107)
CO2 SERPL-SCNC: 26 MMOL/L (ref 21–32)
COLOR UR: NORMAL
CREAT SERPL-MCNC: 1.33 MG/DL (ref 0.5–1.3)
EGFRCR SERPLBLD CKD-EPI 2021: 58 ML/MIN/1.73M*2
EOSINOPHIL # BLD AUTO: 0.1 X10*3/UL (ref 0–0.7)
EOSINOPHIL NFR BLD AUTO: 2.2 %
ERYTHROCYTE [DISTWIDTH] IN BLOOD BY AUTOMATED COUNT: 14.2 % (ref 11.5–14.5)
GLUCOSE SERPL-MCNC: 113 MG/DL (ref 74–99)
GLUCOSE UR STRIP.AUTO-MCNC: NORMAL MG/DL
HCT VFR BLD AUTO: 43.8 % (ref 41–52)
HGB BLD-MCNC: 14.5 G/DL (ref 13.5–17.5)
IMM GRANULOCYTES # BLD AUTO: 0.03 X10*3/UL (ref 0–0.7)
IMM GRANULOCYTES NFR BLD AUTO: 0.6 % (ref 0–0.9)
KETONES UR STRIP.AUTO-MCNC: NEGATIVE MG/DL
LEUKOCYTE ESTERASE UR QL STRIP.AUTO: NEGATIVE
LYMPHOCYTES # BLD AUTO: 1.77 X10*3/UL (ref 1.2–4.8)
LYMPHOCYTES NFR BLD AUTO: 38.1 %
MCH RBC QN AUTO: 28.7 PG (ref 26–34)
MCHC RBC AUTO-ENTMCNC: 33.1 G/DL (ref 32–36)
MCV RBC AUTO: 87 FL (ref 80–100)
MONOCYTES # BLD AUTO: 0.37 X10*3/UL (ref 0.1–1)
MONOCYTES NFR BLD AUTO: 8 %
NEUTROPHILS # BLD AUTO: 2.34 X10*3/UL (ref 1.2–7.7)
NEUTROPHILS NFR BLD AUTO: 50.2 %
NITRITE UR QL STRIP.AUTO: NEGATIVE
NRBC BLD-RTO: 0 /100 WBCS (ref 0–0)
PH UR STRIP.AUTO: 5.5 [PH]
PLATELET # BLD AUTO: 191 X10*3/UL (ref 150–450)
POTASSIUM SERPL-SCNC: 4.3 MMOL/L (ref 3.5–5.3)
PROT SERPL-MCNC: 7.1 G/DL (ref 6.4–8.2)
PROT UR STRIP.AUTO-MCNC: NEGATIVE MG/DL
PSA SERPL-MCNC: 0.26 NG/ML
RBC # BLD AUTO: 5.06 X10*6/UL (ref 4.5–5.9)
RBC # UR STRIP.AUTO: NEGATIVE /UL
SODIUM SERPL-SCNC: 143 MMOL/L (ref 136–145)
SP GR UR STRIP.AUTO: 1.02
TSH SERPL-ACNC: 0.82 MIU/L (ref 0.44–3.98)
URATE SERPL-MCNC: 7.5 MG/DL (ref 4–7.5)
UROBILINOGEN UR STRIP.AUTO-MCNC: NORMAL MG/DL
WBC # BLD AUTO: 4.7 X10*3/UL (ref 4.4–11.3)

## 2024-12-07 PROCEDURE — 85025 COMPLETE CBC W/AUTO DIFF WBC: CPT

## 2024-12-07 PROCEDURE — 36415 COLL VENOUS BLD VENIPUNCTURE: CPT

## 2024-12-07 PROCEDURE — 84153 ASSAY OF PSA TOTAL: CPT

## 2024-12-07 PROCEDURE — 81003 URINALYSIS AUTO W/O SCOPE: CPT

## 2024-12-07 PROCEDURE — 80053 COMPREHEN METABOLIC PANEL: CPT

## 2024-12-07 PROCEDURE — 84443 ASSAY THYROID STIM HORMONE: CPT

## 2024-12-07 PROCEDURE — 84550 ASSAY OF BLOOD/URIC ACID: CPT

## 2024-12-09 DIAGNOSIS — M1A.0690 CHRONIC GOUT OF KNEE, UNSPECIFIED CAUSE, UNSPECIFIED LATERALITY: Primary | ICD-10-CM

## 2024-12-09 RX ORDER — MELOXICAM 15 MG/1
15 TABLET ORAL DAILY
Qty: 90 TABLET | Refills: 1 | Status: SHIPPED | OUTPATIENT
Start: 2024-12-09

## 2025-01-07 ENCOUNTER — TELEPHONE (OUTPATIENT)
Dept: OPHTHALMOLOGY | Age: 70
End: 2025-01-07
Payer: MEDICARE

## 2025-01-08 ENCOUNTER — TELEPHONE (OUTPATIENT)
Dept: OPHTHALMOLOGY | Age: 70
End: 2025-01-08
Payer: MEDICARE

## 2025-01-08 NOTE — TELEPHONE ENCOUNTER
Attempted to return patient call. LVM at 10:25am with tentative triage appointment today at [facility] [address] [facility] [address] at 2:30pm today with triage line to confirm if pt can come.

## 2025-01-10 ENCOUNTER — OFFICE VISIT (OUTPATIENT)
Dept: OPHTHALMOLOGY | Facility: CLINIC | Age: 70
End: 2025-01-10
Payer: MEDICARE

## 2025-01-10 DIAGNOSIS — H35.61: ICD-10-CM

## 2025-01-10 DIAGNOSIS — H43.392 VITREOUS FLOATERS OF LEFT EYE: Primary | ICD-10-CM

## 2025-01-10 DIAGNOSIS — H40.003 GLAUCOMA SUSPECT OF BOTH EYES: ICD-10-CM

## 2025-01-10 DIAGNOSIS — Z96.1 PSEUDOPHAKIA: ICD-10-CM

## 2025-01-10 PROCEDURE — 99214 OFFICE O/P EST MOD 30 MIN: CPT | Performed by: OPTOMETRIST

## 2025-01-10 ASSESSMENT — ENCOUNTER SYMPTOMS
EYES NEGATIVE: 1
CARDIOVASCULAR NEGATIVE: 0
GASTROINTESTINAL NEGATIVE: 0
MUSCULOSKELETAL NEGATIVE: 0
HEMATOLOGIC/LYMPHATIC NEGATIVE: 0
ENDOCRINE NEGATIVE: 0
RESPIRATORY NEGATIVE: 0
NEUROLOGICAL NEGATIVE: 0
CONSTITUTIONAL NEGATIVE: 0
ALLERGIC/IMMUNOLOGIC NEGATIVE: 0
PSYCHIATRIC NEGATIVE: 0

## 2025-01-10 ASSESSMENT — PACHYMETRY
OS_CT(UM): 546
OD_CT(UM): 551

## 2025-01-10 ASSESSMENT — CONF VISUAL FIELD
OD_SUPERIOR_NASAL_RESTRICTION: 0
OS_INFERIOR_NASAL_RESTRICTION: 0
OD_SUPERIOR_TEMPORAL_RESTRICTION: 0
OD_INFERIOR_NASAL_RESTRICTION: 0
OS_SUPERIOR_NASAL_RESTRICTION: 0
METHOD: COUNTING FINGERS
OS_INFERIOR_TEMPORAL_RESTRICTION: 0
OS_SUPERIOR_TEMPORAL_RESTRICTION: 0
OS_NORMAL: 1
OD_NORMAL: 1
OD_INFERIOR_TEMPORAL_RESTRICTION: 0

## 2025-01-10 ASSESSMENT — SLIT LAMP EXAM - LIDS
COMMENTS: NORMAL
COMMENTS: NORMAL

## 2025-01-10 ASSESSMENT — TONOMETRY
IOP_METHOD: TONOPEN
OS_IOP_MMHG: 14
OD_IOP_MMHG: 12

## 2025-01-10 ASSESSMENT — EXTERNAL EXAM - RIGHT EYE: OD_EXAM: NORMAL

## 2025-01-10 ASSESSMENT — EXTERNAL EXAM - LEFT EYE: OS_EXAM: NORMAL

## 2025-01-10 ASSESSMENT — VISUAL ACUITY
OD_SC: 20/20
OS_PH_SC: 20/25
OS_PH_SC+: -2
METHOD: SNELLEN - LINEAR
OS_SC: 20/70

## 2025-01-10 ASSESSMENT — CUP TO DISC RATIO
OS_RATIO: 0.65
OD_RATIO: 0.6

## 2025-01-10 NOTE — PROGRESS NOTES
Assessment/Plan   Diagnoses and all orders for this visit:    Vitreous floaters of left eye  S/p core mid-peripheral vitrectomy with Dr. Arevalo in 2021 for retained lens material after CEIOL  Discussed signs and symptoms of retinal hole, tear, detachment. Patient educated that retinal detachment can lead to permanent vision loss. Patient voices understanding to return if they notice new floaters, flashes, curtain or veil covering vision.     Glaucoma suspect of both eyes  Recommend baseline OCT RNFL and HVF at next visit    Dot-blot hemorrhage of the right eye  Single peripheral hemorrhage on exam  + HTN, no history of diabetes  Monitor    RTC retina 1 month, sooner PRN

## 2025-01-19 DIAGNOSIS — I10 ESSENTIAL HYPERTENSION: ICD-10-CM

## 2025-01-19 RX ORDER — AMLODIPINE BESYLATE 10 MG/1
10 TABLET ORAL DAILY
Qty: 90 TABLET | Refills: 3 | Status: SHIPPED | OUTPATIENT
Start: 2025-01-19

## 2025-01-27 ENCOUNTER — APPOINTMENT (OUTPATIENT)
Dept: PRIMARY CARE | Facility: HOSPITAL | Age: 70
End: 2025-01-27
Payer: MEDICARE

## 2025-02-07 ENCOUNTER — APPOINTMENT (OUTPATIENT)
Dept: PRIMARY CARE | Facility: CLINIC | Age: 70
End: 2025-02-07
Payer: MEDICARE

## 2025-02-10 ENCOUNTER — APPOINTMENT (OUTPATIENT)
Dept: OPHTHALMOLOGY | Facility: CLINIC | Age: 70
End: 2025-02-10
Payer: MEDICARE

## 2025-03-10 ENCOUNTER — APPOINTMENT (OUTPATIENT)
Dept: OPHTHALMOLOGY | Facility: CLINIC | Age: 70
End: 2025-03-10
Payer: MEDICARE

## 2025-03-12 DIAGNOSIS — Z00.00 VISIT FOR ANNUAL HEALTH EXAMINATION: ICD-10-CM

## 2025-03-13 ENCOUNTER — APPOINTMENT (OUTPATIENT)
Dept: RADIOLOGY | Facility: HOSPITAL | Age: 70
End: 2025-03-13
Payer: COMMERCIAL

## 2025-03-13 ENCOUNTER — HOSPITAL ENCOUNTER (EMERGENCY)
Facility: HOSPITAL | Age: 70
Discharge: HOME | End: 2025-03-13
Attending: EMERGENCY MEDICINE
Payer: COMMERCIAL

## 2025-03-13 VITALS
RESPIRATION RATE: 14 BRPM | HEIGHT: 72 IN | TEMPERATURE: 97.6 F | WEIGHT: 210 LBS | DIASTOLIC BLOOD PRESSURE: 78 MMHG | OXYGEN SATURATION: 98 % | SYSTOLIC BLOOD PRESSURE: 148 MMHG | BODY MASS INDEX: 28.44 KG/M2 | HEART RATE: 72 BPM

## 2025-03-13 DIAGNOSIS — N39.0 URINARY TRACT INFECTION WITHOUT HEMATURIA, SITE UNSPECIFIED: Primary | ICD-10-CM

## 2025-03-13 DIAGNOSIS — N20.0 KIDNEY STONE: ICD-10-CM

## 2025-03-13 LAB
ALBUMIN SERPL BCP-MCNC: 4.3 G/DL (ref 3.4–5)
ALP SERPL-CCNC: 53 U/L (ref 33–136)
ALT SERPL W P-5'-P-CCNC: 13 U/L (ref 10–52)
ANION GAP SERPL CALC-SCNC: 7 MMOL/L (ref 10–20)
APPEARANCE UR: CLEAR
AST SERPL W P-5'-P-CCNC: 13 U/L (ref 9–39)
BACTERIA #/AREA URNS AUTO: ABNORMAL /HPF
BASOPHILS # BLD AUTO: 0.02 X10*3/UL (ref 0–0.1)
BASOPHILS NFR BLD AUTO: 0.5 %
BILIRUB SERPL-MCNC: 0.8 MG/DL (ref 0–1.2)
BILIRUB UR STRIP.AUTO-MCNC: NEGATIVE MG/DL
BUN SERPL-MCNC: 14 MG/DL (ref 6–23)
CALCIUM SERPL-MCNC: 9.4 MG/DL (ref 8.6–10.3)
CHLORIDE SERPL-SCNC: 109 MMOL/L (ref 98–107)
CO2 SERPL-SCNC: 28 MMOL/L (ref 21–32)
COLOR UR: YELLOW
CREAT SERPL-MCNC: 1.29 MG/DL (ref 0.5–1.3)
EGFRCR SERPLBLD CKD-EPI 2021: 60 ML/MIN/1.73M*2
EOSINOPHIL # BLD AUTO: 0.11 X10*3/UL (ref 0–0.7)
EOSINOPHIL NFR BLD AUTO: 2.7 %
ERYTHROCYTE [DISTWIDTH] IN BLOOD BY AUTOMATED COUNT: 14.5 % (ref 11.5–14.5)
GLUCOSE SERPL-MCNC: 84 MG/DL (ref 74–99)
GLUCOSE UR STRIP.AUTO-MCNC: NORMAL MG/DL
HCT VFR BLD AUTO: 45.8 % (ref 41–52)
HGB BLD-MCNC: 15.1 G/DL (ref 13.5–17.5)
IMM GRANULOCYTES # BLD AUTO: 0.01 X10*3/UL (ref 0–0.7)
IMM GRANULOCYTES NFR BLD AUTO: 0.2 % (ref 0–0.9)
KETONES UR STRIP.AUTO-MCNC: NEGATIVE MG/DL
LEUKOCYTE ESTERASE UR QL STRIP.AUTO: ABNORMAL
LYMPHOCYTES # BLD AUTO: 1.4 X10*3/UL (ref 1.2–4.8)
LYMPHOCYTES NFR BLD AUTO: 34.3 %
MCH RBC QN AUTO: 28.2 PG (ref 26–34)
MCHC RBC AUTO-ENTMCNC: 33 G/DL (ref 32–36)
MCV RBC AUTO: 85 FL (ref 80–100)
MONOCYTES # BLD AUTO: 0.32 X10*3/UL (ref 0.1–1)
MONOCYTES NFR BLD AUTO: 7.8 %
MUCOUS THREADS #/AREA URNS AUTO: ABNORMAL /LPF
NEUTROPHILS # BLD AUTO: 2.22 X10*3/UL (ref 1.2–7.7)
NEUTROPHILS NFR BLD AUTO: 54.5 %
NITRITE UR QL STRIP.AUTO: NEGATIVE
NRBC BLD-RTO: 0 /100 WBCS (ref 0–0)
PH UR STRIP.AUTO: 5.5 [PH]
PLATELET # BLD AUTO: 185 X10*3/UL (ref 150–450)
POTASSIUM SERPL-SCNC: 4.4 MMOL/L (ref 3.5–5.3)
PROT SERPL-MCNC: 7.1 G/DL (ref 6.4–8.2)
PROT UR STRIP.AUTO-MCNC: ABNORMAL MG/DL
RBC # BLD AUTO: 5.36 X10*6/UL (ref 4.5–5.9)
RBC # UR STRIP.AUTO: NEGATIVE MG/DL
RBC #/AREA URNS AUTO: ABNORMAL /HPF
SODIUM SERPL-SCNC: 140 MMOL/L (ref 136–145)
SP GR UR STRIP.AUTO: 1.02
SQUAMOUS #/AREA URNS AUTO: ABNORMAL /HPF
UROBILINOGEN UR STRIP.AUTO-MCNC: NORMAL MG/DL
WBC # BLD AUTO: 4.1 X10*3/UL (ref 4.4–11.3)
WBC #/AREA URNS AUTO: ABNORMAL /HPF

## 2025-03-13 PROCEDURE — 99284 EMERGENCY DEPT VISIT MOD MDM: CPT | Mod: 25 | Performed by: EMERGENCY MEDICINE

## 2025-03-13 PROCEDURE — 74176 CT ABD & PELVIS W/O CONTRAST: CPT | Mod: FOREIGN READ | Performed by: RADIOLOGY

## 2025-03-13 PROCEDURE — 81003 URINALYSIS AUTO W/O SCOPE: CPT

## 2025-03-13 PROCEDURE — 84075 ASSAY ALKALINE PHOSPHATASE: CPT

## 2025-03-13 PROCEDURE — 74176 CT ABD & PELVIS W/O CONTRAST: CPT

## 2025-03-13 PROCEDURE — 85025 COMPLETE CBC W/AUTO DIFF WBC: CPT

## 2025-03-13 PROCEDURE — 87086 URINE CULTURE/COLONY COUNT: CPT | Mod: AHULAB

## 2025-03-13 PROCEDURE — 36415 COLL VENOUS BLD VENIPUNCTURE: CPT

## 2025-03-13 RX ORDER — CEPHALEXIN 500 MG/1
500 CAPSULE ORAL 4 TIMES DAILY
Qty: 40 CAPSULE | Refills: 0 | Status: SHIPPED | OUTPATIENT
Start: 2025-03-13 | End: 2025-03-23

## 2025-03-13 ASSESSMENT — COLUMBIA-SUICIDE SEVERITY RATING SCALE - C-SSRS
1. IN THE PAST MONTH, HAVE YOU WISHED YOU WERE DEAD OR WISHED YOU COULD GO TO SLEEP AND NOT WAKE UP?: NO
2. HAVE YOU ACTUALLY HAD ANY THOUGHTS OF KILLING YOURSELF?: NO
6. HAVE YOU EVER DONE ANYTHING, STARTED TO DO ANYTHING, OR PREPARED TO DO ANYTHING TO END YOUR LIFE?: NO

## 2025-03-13 ASSESSMENT — PAIN SCALES - GENERAL
PAINLEVEL_OUTOF10: 0 - NO PAIN
PAINLEVEL_OUTOF10: 0 - NO PAIN

## 2025-03-13 ASSESSMENT — PAIN - FUNCTIONAL ASSESSMENT: PAIN_FUNCTIONAL_ASSESSMENT: 0-10

## 2025-03-13 NOTE — ED TRIAGE NOTES
PT is A/Ox4 coming in for urinary issues.  PT stated that last night he had the urge  to go to the restroom and was only able to go a liitle. Pt stated that he has a history of an overactive bladder. Denies any blood in the urin and is experiencing lower ABD pain and pain in the penis.

## 2025-03-13 NOTE — ED PROVIDER NOTES
Emergency Department Provider Note        History of Present Illness     History provided by: Patient  Limitations to History: None    HPI:  Patient is a 69-year-old male the past medical history of prostate cancer status post TURP procedure chronic bladder spasms presenting to the ED for difficulty urinating.  Patient states he typically urinates on himself however states that he cannot produce a steady stream.  Patient states he continues to drip however is having difficulty urinating.  Patient denies any flank pain however states some tenderness in the suprapubic region.  Patient denies any malodorous urine or dysuria.  Patient states history of kidney stones and denies any hematuria.  Physical Exam   Triage vitals:  T 36.1 °C (96.9 °F)  HR 82  /77  RR 16  O2 96 % None (Room air)    Physical Exam  Constitutional:       Appearance: Normal appearance.   HENT:      Head: Normocephalic.   Cardiovascular:      Rate and Rhythm: Normal rate.   Pulmonary:      Effort: Pulmonary effort is normal.   Abdominal:      General: Abdomen is flat.      Tenderness: There is abdominal tenderness. There is right CVA tenderness and left CVA tenderness.   Musculoskeletal:         General: Normal range of motion.      Cervical back: Normal range of motion.   Skin:     General: Skin is warm.   Neurological:      General: No focal deficit present.      Mental Status: He is alert.          Medical Decision Making & ED Course   Medical Decision Making:  Patient is a 69-year-old presenting to ED with difficulty urinating.  Patient states he still able to urinate however cannot produce steady stream.  Patient states he typically has bladder spasms and urinates on himself however this did not happen this morning.  Patient has no malodorous urine no hematuria.  Patient is mildly tender in the suprapubic region however no flank pain tenderness.  Differential includes UTI, pyelonephritis, nephrolithiasis.  Patient urine was concerning  for potential UTI, CT abdomen was obtained without contrast which showed kidney stone in the bladder.  At this time patient was treated with Keflex and was discharged       EKG Independent Interpretation: EKG not obtained        The patient was discussed with the following consultants/services: None      Diagnoses as of 03/14/25 1024   Urinary tract infection without hematuria, site unspecified   Kidney stone          Disposition   As a result of the work-up, the patient was discharged home.  he was informed of his diagnosis and instructed to come back with any concerns or worsening of condition.  he and was agreeable to the plan as discussed above.  he was given the opportunity to ask questions.  All of the patient's questions were answered.    Procedures   Procedures    Patient seen and discussed with ED attending physician.    Elinor Peace MD  Emergency Medicine     Elinor Peace MD  Resident  03/14/25 [id number]

## 2025-03-14 ENCOUNTER — APPOINTMENT (OUTPATIENT)
Dept: OPHTHALMOLOGY | Facility: CLINIC | Age: 70
End: 2025-03-14
Payer: COMMERCIAL

## 2025-03-14 ENCOUNTER — OFFICE VISIT (OUTPATIENT)
Dept: OPHTHALMOLOGY | Facility: CLINIC | Age: 70
End: 2025-03-14
Payer: COMMERCIAL

## 2025-03-14 DIAGNOSIS — H35.61: Primary | ICD-10-CM

## 2025-03-14 DIAGNOSIS — H43.392 VITREOUS FLOATERS OF LEFT EYE: Primary | ICD-10-CM

## 2025-03-14 DIAGNOSIS — H43.392 VITREOUS FLOATERS OF LEFT EYE: ICD-10-CM

## 2025-03-14 LAB — BACTERIA UR CULT: NORMAL

## 2025-03-14 PROCEDURE — 92134 CPTRZ OPH DX IMG PST SGM RTA: CPT | Performed by: OPHTHALMOLOGY

## 2025-03-14 PROCEDURE — 99214 OFFICE O/P EST MOD 30 MIN: CPT | Performed by: OPHTHALMOLOGY

## 2025-03-14 ASSESSMENT — CONF VISUAL FIELD
OD_NORMAL: 1
OD_SUPERIOR_NASAL_RESTRICTION: 0
OS_SUPERIOR_NASAL_RESTRICTION: 0
OS_INFERIOR_TEMPORAL_RESTRICTION: 0
OD_INFERIOR_NASAL_RESTRICTION: 0
OS_NORMAL: 1
METHOD: COUNTING FINGERS
OD_SUPERIOR_TEMPORAL_RESTRICTION: 0
OD_INFERIOR_TEMPORAL_RESTRICTION: 0
OS_SUPERIOR_TEMPORAL_RESTRICTION: 0
OS_INFERIOR_NASAL_RESTRICTION: 0

## 2025-03-14 ASSESSMENT — ENCOUNTER SYMPTOMS
RESPIRATORY NEGATIVE: 0
HEMATOLOGIC/LYMPHATIC NEGATIVE: 0
CARDIOVASCULAR NEGATIVE: 0
PSYCHIATRIC NEGATIVE: 0
NEUROLOGICAL NEGATIVE: 0
ALLERGIC/IMMUNOLOGIC NEGATIVE: 0
GASTROINTESTINAL NEGATIVE: 0
CONSTITUTIONAL NEGATIVE: 0
MUSCULOSKELETAL NEGATIVE: 0
ENDOCRINE NEGATIVE: 0
EYES NEGATIVE: 1

## 2025-03-14 ASSESSMENT — VISUAL ACUITY
OS_SC+: -2
METHOD: SNELLEN - LINEAR
OD_SC: 20/20
OS_SC: 20/40
OD_SC+: -2

## 2025-03-14 ASSESSMENT — PACHYMETRY
OD_CT(UM): 551
OS_CT(UM): 546

## 2025-03-14 ASSESSMENT — TONOMETRY
OD_IOP_MMHG: 11
IOP_METHOD: GOLDMANN APPLANATION
OS_IOP_MMHG: 15

## 2025-03-14 ASSESSMENT — REFRACTION_WEARINGRX
OS_CYLINDER: -0.50
OD_SPHERE: +0.25
OS_SPHERE: -0.75
OS_AXIS: 046
OD_CYLINDER: -0.50
OD_AXIS: 044

## 2025-03-14 NOTE — PROGRESS NOTES
Imaging  OCT mac : 03/14/25   OD: Normal Foveal Contour & Retinal laminations, EZ line preserved, (-) IRF/subretinal fluid (SRF), CST-WNL  OS: Normal Foveal Contour & Retinal laminations, EZ line preserved, (-) IRF/subretinal fluid (SRF), CST-WNL     Assessment/Plan     Referral from Dr. Lane seen 1/10/25    Vitreous floaters of left eye  S/p core mid-peripheral vitrectomy with Dr. Arevalo in 2021 for retained lens material after CEIOL  Vitreous condensations noted OS today 3/14/25  Discussed option of pars plana vitrectomy (PPV)/floaterectomy OS with pt vis-a-vis ADLs, quality of life, pt elects to observe at present  Discussed signs and symptoms of retinal hole, tear, detachment. Patient educated that retinal detachment can lead to permanent vision loss. Patient voices understanding to return if they notice new floaters, flashes, curtain or veil covering vision.   -OK to return to Dr Lane for below and dilated examination    Glaucoma suspect of both eyes  Recommend baseline OCT RNFL and HVF with Dr Lane    Dot-blot hemorrhage of the right eye  -Not noted today on examination  -+history of HTN, may be underlying this history, discussed with pt who expresses understanding  -OK to monitor with Dr Lane      RTC Dr Lane 2 months, refer to retina clinic as needed

## 2025-03-14 NOTE — PROGRESS NOTES
Assessment/Plan     Referral from Dr. Lane seen 1/10/25    ***      Diagnoses and all orders for this visit:    Vitreous floaters of left eye  S/p core mid-peripheral vitrectomy with Dr. Arevalo in 2021 for retained lens material after CEIOL  Discussed signs and symptoms of retinal hole, tear, detachment. Patient educated that retinal detachment can lead to permanent vision loss. Patient voices understanding to return if they notice new floaters, flashes, curtain or veil covering vision.     Glaucoma suspect of both eyes  Recommend baseline OCT RNFL and HVF at next visit    Dot-blot hemorrhage of the right eye  Single peripheral hemorrhage on exam  + HTN, no history of diabetes  Monitor    RTC retina 1 month, sooner PRN

## 2025-03-15 ASSESSMENT — SLIT LAMP EXAM - LIDS
COMMENTS: NORMAL
COMMENTS: NORMAL

## 2025-03-15 ASSESSMENT — CUP TO DISC RATIO
OS_RATIO: 0.65
OD_RATIO: 0.6

## 2025-03-15 ASSESSMENT — EXTERNAL EXAM - LEFT EYE: OS_EXAM: NORMAL

## 2025-03-15 ASSESSMENT — EXTERNAL EXAM - RIGHT EYE: OD_EXAM: NORMAL

## 2025-03-20 ENCOUNTER — APPOINTMENT (OUTPATIENT)
Dept: PRIMARY CARE | Facility: CLINIC | Age: 70
End: 2025-03-20
Payer: MEDICARE

## 2025-03-24 ENCOUNTER — HOSPITAL ENCOUNTER (OUTPATIENT)
Dept: RADIOLOGY | Facility: CLINIC | Age: 70
Discharge: HOME | End: 2025-03-24
Payer: COMMERCIAL

## 2025-03-24 ENCOUNTER — LAB (OUTPATIENT)
Dept: LAB | Facility: LAB | Age: 70
End: 2025-03-24
Payer: COMMERCIAL

## 2025-03-24 DIAGNOSIS — Z00.00 VISIT FOR ANNUAL HEALTH EXAMINATION: ICD-10-CM

## 2025-04-03 ENCOUNTER — APPOINTMENT (OUTPATIENT)
Dept: UROLOGY | Facility: CLINIC | Age: 70
End: 2025-04-03
Payer: COMMERCIAL

## 2025-04-14 ENCOUNTER — OFFICE VISIT (OUTPATIENT)
Dept: UROLOGY | Facility: HOSPITAL | Age: 70
End: 2025-04-14
Payer: MEDICARE

## 2025-04-14 DIAGNOSIS — N39.3 STRESS INCONTINENCE: ICD-10-CM

## 2025-04-14 DIAGNOSIS — C61 ADENOCARCINOMA OF PROSTATE (MULTI): Primary | ICD-10-CM

## 2025-04-14 PROCEDURE — 99214 OFFICE O/P EST MOD 30 MIN: CPT | Performed by: STUDENT IN AN ORGANIZED HEALTH CARE EDUCATION/TRAINING PROGRAM

## 2025-04-14 PROCEDURE — G2211 COMPLEX E/M VISIT ADD ON: HCPCS | Performed by: STUDENT IN AN ORGANIZED HEALTH CARE EDUCATION/TRAINING PROGRAM

## 2025-04-14 PROCEDURE — 1159F MED LIST DOCD IN RCRD: CPT | Performed by: STUDENT IN AN ORGANIZED HEALTH CARE EDUCATION/TRAINING PROGRAM

## 2025-04-14 NOTE — PROGRESS NOTES
UROLOGIC FOLLOW-UP VISIT     PROBLEM LIST:  1. Adenocarcinoma of prostate (Multi)        2. Urge incontinence             HISTORY OF PRESENT ILLNESS:   67-year-old male with history of recurrent nephrolithiasis, prostate cancer status post IMRT radiation therapy consisting of a dose of 81 Gy, completed July 22, 2009 with stable PSA, BPH and erectile dysfunction.   He was started on Flomax and we doubled the dose and states he was able to empty his bladder but continued to have a very slow stream and postvoid dribbling as well as urinary hesitancy and intermittent urinary incontinence.  After extensive discussion patient elected to undergo transurethral resection of the prostate.  The surgery was performed on November 28, 2023 and at that time patient was noted to have a dense bulbar urethral stricture.  The stricture was dilated and a TURP was performed resecting a modest amount of prostate tissue.      Postoperatively patient states his urinary flow is much improved however he had symptoms consisting of urge incontinence postoperatively.  He was started on trospium and discontinued his Flomax which improved his urinary leakage.  However patient states today he has urinary leakage now with changing of positions and occasionally with lifting objects.  He states he wears protective undergarments and not padded so he is unable to quantify how many pads a day he requires.  Patient states that his leakage has improved since surgery however he can still use that bothersome urinary leakage.  He denies any dysuria, hematuria, fevers, chills, nausea, vomiting. PVR today was 0cc He otherwise denies any urgency, frequency, hematuria, dysuria, fevers, chills, nausea, vomiting.     PAST MEDICAL HISTORY:  Past Medical History:   Diagnosis Date    Arthritis     BPH (benign prostatic hyperplasia)     ED (erectile dysfunction)     Glaucoma     Gout     Hyperlipidemia     Hypertension     Nephrolithiasis     Prostate cancer (Multi)  2009    radiation therapy completed 7/22/2009    Renal calculi     TIA (transient ischemic attack)     Vitamin D deficiency        PAST SURGICAL HISTORY:  Past Surgical History:   Procedure Laterality Date    COLONOSCOPY      KNEE ARTHROSCOPY W/ ACL RECONSTRUCTION      MR HEAD ANGIO WO IV CONTRAST  06/25/2020    MR HEAD ANGIO WO IV CONTRAST 6/25/2020 [facility]    MR NECK ANGIO WO IV CONTRAST  06/25/2020    MR NECK ANGIO WO IV CONTRAST 6/25/2020 [facility]    ORIF RADIUS & ULNA FRACTURES          ALLERGIES:   Allergies   Allergen Reactions    Ace Inhibitors Angioedema and Unknown    Hydrochlorothiazide Swelling    Iodinated Contrast Media Unknown    Iodine Other     Gout    Losartan Unknown    Shellfish Derived Unknown and Other     Shellfish-derived Products    Gout        MEDICATIONS:     Current Outpatient Medications:     allopurinol (Zyloprim) 100 mg tablet, Take 1 tablet (100 mg) by mouth 3 times a day., Disp: , Rfl:     amLODIPine (Norvasc) 10 mg tablet, TAKE 1 TABLET BY MOUTH EVERY DAY FOR BLOOD PRESSURE, Disp: 90 tablet, Rfl: 3    amLODIPine (Norvasc) 5 mg tablet, Take 6 mg by mouth once daily., Disp: , Rfl:     aspirin 81 mg EC tablet, Take 1 tablet (81 mg) by mouth once daily., Disp: , Rfl:     colchicine, gout, (Mitigare) 0.6 mg capsule capsule, Take 2 capsules (1.2 mg) by mouth. FOR ONE DOSE THEN REPEAT ANOTHER TAB 1-2 HOURS LATER. MAY REPEAT IN 3 DAYS, Disp: , Rfl:     esomeprazole (NexIUM) 20 mg DR capsule, Take 1 capsule (20 mg) by mouth once daily., Disp: , Rfl:     ibuprofen 400 mg tablet, Take 1 tablet (400 mg) by mouth 3 times a day as needed., Disp: , Rfl:     meloxicam (Mobic) 15 mg tablet, Take 1 tablet (15 mg) by mouth once daily., Disp: 90 tablet, Rfl: 1    methocarbamol (Robaxin) 750 mg tablet, TAKE 1 TABLET 3 TIMES DAILY AS NEEDED FOR PAIN AND SPASMS, Disp: 60 tablet, Rfl: 2    tadalafil (Cialis) 20 mg tablet, Take 1 tablet (20 mg) by mouth. 1 hour before activity as  needed, Disp: , Rfl:       SOCIAL HISTORY:  Patient  reports that he has never smoked. He has never used smokeless tobacco. He reports current alcohol use. He reports that he does not use drugs.   Social History     Socioeconomic History    Marital status:      Spouse name: Not on file    Number of children: Not on file    Years of education: Not on file    Highest education level: Not on file   Occupational History    Not on file   Tobacco Use    Smoking status: Never    Smokeless tobacco: Never   Substance and Sexual Activity    Alcohol use: Yes     Comment: socially    Drug use: Never    Sexual activity: Defer   Other Topics Concern    Not on file   Social History Narrative    Not on file     Social Drivers of Health     Financial Resource Strain: Not on file   Food Insecurity: Unknown (5/10/2024)    Received from [facility] Vital Sign     Worried About Running Out of Food in the Last Year: Never true     Ran Out of Food in the Last Year: Not on file   Transportation Needs: Not on file   Physical Activity: Not on file   Stress: Not on file   Social Connections: Not on file   Intimate Partner Violence: Not on file   Housing Stability: Not on file       FAMILY HISTORY:  Family History   Problem Relation Name Age of Onset    Prostate cancer Father         REVIEW OF SYSTEMS:  Constitutional: Negative for fever and chills. Denies anorexia, weight loss.  Eyes: Negative for visual disturbance.   Respiratory: Negative for shortness of breath.    Cardiovascular: Negative for chest pain.   Gastrointestinal: Negative for nausea and vomiting.   Genitourinary: See interval history above.  Skin: Negative for rash.   Neurological: Negative for dizziness and numbness.   Psychiatric/Behavioral: Negative for confusion and decreased concentration.     PHYSICAL EXAM:  There were no vitals taken for this visit.  Constitutional: Patient appears well-developed and well-nourished. No distress.    Head:  Normocephalic and atraumatic.    Neck: Normal range of motion.    Cardiovascular: Normal rate.    Pulmonary/Chest: Effort normal. No respiratory distress.   Abdominal: soft NTND  Musculoskeletal: Normal range of motion.    Neurological: Alert and oriented to person, place, and time.  Psychiatric: Normal mood and affect. Behavior is normal. Thought content normal.      Lab Results   Component Value Date    BUN 14 03/13/2025    CREATININE 1.29 03/13/2025    EGFR 60 (L) 03/13/2025     03/13/2025    K 4.4 03/13/2025     (H) 03/13/2025    CO2 28 03/13/2025    CALCIUM 9.4 03/13/2025      Lab Results   Component Value Date    WBC 4.1 (L) 03/13/2025    RBC 5.36 03/13/2025    HGB 15.1 03/13/2025    HCT 45.8 03/13/2025    MCV 85 03/13/2025    MCH 28.2 03/13/2025    MCHC 33.0 03/13/2025    RDW 14.5 03/13/2025     03/13/2025        Lab Results   Component Value Date    PSA 0.26 12/07/2024    PSA 0.46 03/12/2023    PSA 0.46 03/12/2023    PSA 1.36 02/14/2023    PSA 0.34 01/03/2023    PSA 0.34 07/26/2022    PSA 0.39 01/25/2022    PSA 0.24 09/28/2021    PSA 0.33 05/13/2021    PSA 0.41 03/30/2021    PSA 0.69 02/28/2020    PSA 0.80 09/10/2019    PSA 0.62 04/05/2019    PSA 0.69 09/28/2018    PSA 1.37 03/16/2018           Assessment:     1. Adenocarcinoma of prostate (Multi)        2. Urge incontinence          Plan:   Discussed in detail with patient that based off his preoperative symptoms it is likely that his urethral stricture was minimizing his urinary incontinence and after dilation the stricture no longer provides resistance to prevent leakage  We reviewed the physiology of normal voiding and the role of the sphincter muscle and preventing urinary leakage and the importance of pelvic floor physical therapy.  Patient would like to perform Kegel exercises at home to help strengthen his pelvic floor and if this does not improve then proceeding to pelvic floor physical therapy  We discussed surgical options for  the management of stress urinary incontinence.   Discussed with patient male sling vs. AUS placement. Pt is interested in AUS placement  Counseled patient on the steps to this surgery and the anticipated recovery    32 minutes total spent on patient's care today; >50% time spent on counseling/coordination of care      Scribe Attestation  By signing my name below, Zoraida MAYORGA Scribe   attest that this documentation has been prepared under the direction and in the presence of Reyes Wilhelm MD.

## 2025-05-01 ENCOUNTER — HOSPITAL ENCOUNTER (OUTPATIENT)
Facility: HOSPITAL | Age: 70
Setting detail: OUTPATIENT SURGERY
End: 2025-05-01
Attending: UROLOGY | Admitting: UROLOGY
Payer: MEDICARE

## 2025-05-01 ENCOUNTER — PREP FOR PROCEDURE (OUTPATIENT)
Dept: UROLOGY | Facility: HOSPITAL | Age: 70
End: 2025-05-01

## 2025-05-01 ENCOUNTER — OFFICE VISIT (OUTPATIENT)
Dept: UROLOGY | Facility: HOSPITAL | Age: 70
End: 2025-05-01
Payer: MEDICARE

## 2025-05-01 DIAGNOSIS — N39.46 MIXED STRESS AND URGE URINARY INCONTINENCE: ICD-10-CM

## 2025-05-01 DIAGNOSIS — N39.3 PRIMARY STRESS URINARY INCONTINENCE: Primary | ICD-10-CM

## 2025-05-01 DIAGNOSIS — R39.9 LOWER URINARY TRACT SYMPTOMS (LUTS): ICD-10-CM

## 2025-05-01 DIAGNOSIS — C61 ADENOCARCINOMA OF PROSTATE (MULTI): Primary | ICD-10-CM

## 2025-05-01 DIAGNOSIS — R79.1 ABNORMAL COAGULATION PROFILE: ICD-10-CM

## 2025-05-01 LAB
POC APPEARANCE, URINE: CLEAR
POC BILIRUBIN, URINE: NEGATIVE
POC BLOOD, URINE: NEGATIVE
POC COLOR, URINE: YELLOW
POC GLUCOSE, URINE: NEGATIVE MG/DL
POC KETONES, URINE: NEGATIVE MG/DL
POC LEUKOCYTES, URINE: NEGATIVE
POC NITRITE,URINE: NEGATIVE
POC PH, URINE: 5.5 PH
POC PROTEIN, URINE: NEGATIVE MG/DL
POC SPECIFIC GRAVITY, URINE: 1.02
POC UROBILINOGEN, URINE: 0.2 EU/DL

## 2025-05-01 PROCEDURE — 81003 URINALYSIS AUTO W/O SCOPE: CPT | Performed by: UROLOGY

## 2025-05-01 PROCEDURE — 1159F MED LIST DOCD IN RCRD: CPT | Performed by: UROLOGY

## 2025-05-01 PROCEDURE — 99214 OFFICE O/P EST MOD 30 MIN: CPT | Performed by: UROLOGY

## 2025-05-01 PROCEDURE — G2211 COMPLEX E/M VISIT ADD ON: HCPCS | Performed by: UROLOGY

## 2025-05-01 RX ORDER — CHLORHEXIDINE GLUCONATE 40 MG/ML
SOLUTION TOPICAL DAILY PRN
OUTPATIENT
Start: 2025-05-01

## 2025-05-01 RX ORDER — GABAPENTIN 300 MG/1
600 CAPSULE ORAL ONCE
OUTPATIENT
Start: 2025-05-01 | End: 2025-05-01

## 2025-05-01 RX ORDER — ACETAMINOPHEN 325 MG/1
650 TABLET ORAL ONCE
OUTPATIENT
Start: 2025-05-01 | End: 2025-05-01

## 2025-05-01 RX ORDER — VANCOMYCIN HYDROCHLORIDE 1 G/200ML
1000 INJECTION, SOLUTION INTRAVENOUS ONCE
OUTPATIENT
Start: 2025-05-01 | End: 2025-05-01

## 2025-05-01 NOTE — PROGRESS NOTES
NPV    Referred by Dr Wilhelm     HISTORY OF PRESENT ILLNESS:   Arthur Harley is a 70 y.o. male who is being seen today for consultation    history of recurrent nephrolithiasis, prostate cancer status post IMRT radiation therapy consisting of a dose of 81 Gy, completed July 22, 2009 with stable PSA, BPH and erectile dysfunction.     Pt hat TURP Nov 2023, had dense bulbar stricture dilated at that time as well    Postoperatively patient states his urinary flow is much improved however he had symptoms consisting of urge incontinence postoperatively. He was started on trospium and discontinued his Flomax which improved his urinary leakage. However patient states today he has urinary leakage now with changing of positions and occasionally with lifting objects. He states he wears protective undergarments and not padded so he is unable to quantify how many pads a day he requires. Patient states that his leakage has improved since surgery however he can still use that bothersome urinary leakage.     Seen by Herlinda Sidhu at [facility] .  Underwent botox injections with no improvement.  Had cystoscopy 9/20/24 - Urethra: Normal until proximal bulb~17F stenosis from prox bulb to bladder neck. Stable in appearance, no dystrophic calcifications Trabeculated blader    PAST MEDICAL HISTORY:  Medical History[1]    PAST SURGICAL HISTORY:  Surgical History[2]     ALLERGIES:   Allergies[3]     MEDICATIONS:   Current Outpatient Medications   Medication Instructions    allopurinol (Zyloprim) 100 mg tablet 1 tablet, 3 times daily    amLODIPine (NORVASC) 6 mg, Daily    amLODIPine (NORVASC) 10 mg, oral, Daily, for blood pressure    aspirin 81 mg EC tablet 1 tablet, Daily    colchicine, gout, (Mitigare) 0.6 mg capsule capsule 2 capsules    esomeprazole (NexIUM) 20 mg DR capsule 1 capsule, Daily    ibuprofen 400 mg tablet 1 tablet, 3 times daily PRN    meloxicam (MOBIC) 15 mg, oral, Daily    methocarbamol (Robaxin) 750 mg tablet TAKE 1 TABLET  3 TIMES DAILY AS NEEDED FOR PAIN AND SPASMS    tadalafil (Cialis) 20 mg tablet 1 tablet        PHYSICAL EXAM:  There were no vitals taken for this visit.  Constitutional: Patient appears well-developed and well-nourished. No distress.    Pulmonary/Chest: Effort normal. No respiratory distress.   Musculoskeletal: Normal range of motion.    Neurological: Alert and oriented to person, place, and time.  Psychiatric: Normal mood and affect. Behavior is normal. Thought content normal.      Labs  Lab Results   Component Value Date    TESTOSTERONE 307 09/28/2018    TESTOSTERONE 316 03/16/2018     Lab Results   Component Value Date    PSA 0.26 12/07/2024     Lab Results   Component Value Date    GFRMALE 50 (A) 06/26/2023     Lab Results   Component Value Date    CREATININE 1.29 03/13/2025     Lab Results   Component Value Date    CHOL 174 05/13/2021     Lab Results   Component Value Date    HDL 43.6 05/13/2021     Lab Results   Component Value Date    CHHDL 4.0 05/13/2021     Lab Results   Component Value Date    LDLF 105 (H) 05/13/2021     Lab Results   Component Value Date    VLDL 26 05/13/2021     Lab Results   Component Value Date    TRIG 128 05/13/2021     Lab Results   Component Value Date    HGBA1C 5.8 07/02/2021     Lab Results   Component Value Date    HCT 45.8 03/13/2025       Imaging    Procedures      Assessment:      1. Adenocarcinoma of prostate (Multi)        2. Lower urinary tract symptoms (LUTS)  Measure post void residual    POCT UA Automated manually resulted      3. Mixed stress and urge urinary incontinence            Arthur Harley is a 70 y.o. male      Plan:   1)  Plan for AUS    AUS  An artificial urinary sphincter is a device that is surgically placed to help with urinary incontinence. The device is filled with saline and uses the fluid to open and close a cuff surrounding the urethra. When you need to urinate, you squeeze and release the pump in the scrotum to remove fluid from the cuff. When  the cuff is empty, urine can flow out of the bladder. The cuff automatically refills in a few minutes squeezing the urethra closed to restore bladder control, mimicking natural urinary control. This surgery is a good option for men with adequate dexterity who are not able to control their incontinence with less invasive methods.  Expectations were set that the goal would be to improve his continence, but may not be completely dry.  We also discussed that there may require revision surgery at a later day (5-10yrs) due to urethra atrophy or decreased pressure within the system.  Mechanical failures can also happen.  Risks including bleeding, pain, infection, erosion, injury to adjacent organs such as testicles, urethra, erectile bodies, bladder, bowel or major blood vessels discussed.        [1]   Past Medical History:  Diagnosis Date    Arthritis     BPH (benign prostatic hyperplasia)     ED (erectile dysfunction)     Glaucoma     Gout     Hyperlipidemia     Hypertension     Nephrolithiasis     Prostate cancer (Multi) 2009    radiation therapy completed 7/22/2009    Renal calculi     TIA (transient ischemic attack)     Vitamin D deficiency    [2]   Past Surgical History:  Procedure Laterality Date    COLONOSCOPY      KNEE ARTHROSCOPY W/ ACL RECONSTRUCTION      MR HEAD ANGIO WO IV CONTRAST  06/25/2020    MR HEAD ANGIO WO IV CONTRAST 6/25/2020 [facility]    MR NECK ANGIO WO IV CONTRAST  06/25/2020    MR NECK ANGIO WO IV CONTRAST 6/25/2020 [facility]    ORIF RADIUS & ULNA FRACTURES     [3]   Allergies  Allergen Reactions    Ace Inhibitors Angioedema and Unknown    Hydrochlorothiazide Swelling    Iodinated Contrast Media Unknown    Iodine Other     Gout    Losartan Unknown    Shellfish Derived Unknown and Other     Shellfish-derived Products    Gout

## 2025-05-05 ENCOUNTER — TELEMEDICINE (OUTPATIENT)
Dept: UROLOGY | Facility: HOSPITAL | Age: 70
End: 2025-05-05
Payer: MEDICARE

## 2025-05-05 ENCOUNTER — HOSPITAL ENCOUNTER (EMERGENCY)
Facility: HOSPITAL | Age: 70
Discharge: HOME | End: 2025-05-06
Attending: INTERNAL MEDICINE
Payer: MEDICARE

## 2025-05-05 ENCOUNTER — APPOINTMENT (OUTPATIENT)
Dept: PRIMARY CARE | Facility: CLINIC | Age: 70
End: 2025-05-05
Payer: COMMERCIAL

## 2025-05-05 DIAGNOSIS — C61 ADENOCARCINOMA OF PROSTATE (MULTI): ICD-10-CM

## 2025-05-05 DIAGNOSIS — N21.1 CALCULUS IN URETHRA: Primary | ICD-10-CM

## 2025-05-05 DIAGNOSIS — R39.9 LOWER URINARY TRACT SYMPTOMS (LUTS): Primary | ICD-10-CM

## 2025-05-05 PROCEDURE — 51702 INSERT TEMP BLADDER CATH: CPT | Mod: 52

## 2025-05-05 PROCEDURE — 99213 OFFICE O/P EST LOW 20 MIN: CPT | Performed by: STUDENT IN AN ORGANIZED HEALTH CARE EDUCATION/TRAINING PROGRAM

## 2025-05-05 PROCEDURE — 99284 EMERGENCY DEPT VISIT MOD MDM: CPT | Performed by: INTERNAL MEDICINE

## 2025-05-05 PROCEDURE — G2211 COMPLEX E/M VISIT ADD ON: HCPCS | Performed by: STUDENT IN AN ORGANIZED HEALTH CARE EDUCATION/TRAINING PROGRAM

## 2025-05-05 ASSESSMENT — COLUMBIA-SUICIDE SEVERITY RATING SCALE - C-SSRS
1. IN THE PAST MONTH, HAVE YOU WISHED YOU WERE DEAD OR WISHED YOU COULD GO TO SLEEP AND NOT WAKE UP?: NO
6. HAVE YOU EVER DONE ANYTHING, STARTED TO DO ANYTHING, OR PREPARED TO DO ANYTHING TO END YOUR LIFE?: NO
2. HAVE YOU ACTUALLY HAD ANY THOUGHTS OF KILLING YOURSELF?: NO

## 2025-05-05 ASSESSMENT — PAIN SCALES - GENERAL: PAINLEVEL_OUTOF10: 0 - NO PAIN

## 2025-05-05 ASSESSMENT — PAIN DESCRIPTION - PAIN TYPE
TYPE: ACUTE PAIN
TYPE: ACUTE PAIN

## 2025-05-05 ASSESSMENT — PAIN - FUNCTIONAL ASSESSMENT
PAIN_FUNCTIONAL_ASSESSMENT: 0-10
PAIN_FUNCTIONAL_ASSESSMENT: 0-10

## 2025-05-05 NOTE — PROGRESS NOTES
Virtual or Telephone Consent    An interactive audio and video telecommunication system which permits real time communications between the patient (at the originating site) and provider (at the distant site) was utilized to provide this telehealth service.   Verbal consent was requested and obtained from Arthur Harley on this date, 05/07/25 for a telehealth visit and the patient's location was confirmed at the time of the visit.   UROLOGIC FOLLOW-UP VISIT     PROBLEM LIST:  1. Lower urinary tract symptoms (LUTS)        2. Adenocarcinoma of prostate (Multi)            HISTORY OF PRESENT ILLNESS:   67-year-old male with history of recurrent nephrolithiasis, prostate cancer status post IMRT radiation therapy consisting of a dose of 81 Gy, completed July 22, 2009 with stable PSA, BPH and erectile dysfunction.   He was started on Flomax and we doubled the dose and states he was able to empty his bladder but continued to have a very slow stream and postvoid dribbling as well as urinary hesitancy and intermittent urinary incontinence.  After extensive discussion patient elected to undergo transurethral resection of the prostate.  The surgery was performed on November 28, 2023 and at that time patient was noted to have a dense bulbar urethral stricture.  The stricture was dilated and a TURP was performed resecting a modest amount of prostate tissue.      Postoperatively patient states his urinary flow is much improved however he had symptoms consisting of urge incontinence postoperatively.  He was started on trospium and discontinued his Flomax which improved his urinary leakage.  However patient states today he has urinary leakage now with changing of positions and occasionally with lifting objects.  He states he wears protective undergarments and not padded so he is unable to quantify how many pads a day he requires.  Patient states that his leakage has improved since surgery however he can still use that bothersome  "urinary leakage.  He denies any dysuria, hematuria, fevers, chills, nausea, vomiting. PVR today was 0cc He otherwise denies any urgency, frequency, hematuria, dysuria, fevers, chills, nausea, vomiting.     Interm history:  5/5/25: Pt met with Dr. Brower, plans to proceed with AUS placement. However, in the interval patient states he has begun having episodes of intense urinary urgency with only minimal urine output. States he only has \"dribbles\" come out when attempting to urinate. Denies any dysuria, hematuria, f/c/n/v.     PAST MEDICAL HISTORY:  Past Medical History:   Diagnosis Date    Arthritis     BPH (benign prostatic hyperplasia)     ED (erectile dysfunction)     Glaucoma     Gout     Hyperlipidemia     Hypertension     Nephrolithiasis     Prostate cancer (Multi) 2009    radiation therapy completed 7/22/2009    Renal calculi     TIA (transient ischemic attack)     Vitamin D deficiency        PAST SURGICAL HISTORY:  Past Surgical History:   Procedure Laterality Date    COLONOSCOPY      KNEE ARTHROSCOPY W/ ACL RECONSTRUCTION      MR HEAD ANGIO WO IV CONTRAST  06/25/2020    MR HEAD ANGIO WO IV CONTRAST 6/25/2020 [facility]    MR NECK ANGIO WO IV CONTRAST  06/25/2020    MR NECK ANGIO WO IV CONTRAST 6/25/2020 [facility]    ORIF RADIUS & ULNA FRACTURES          ALLERGIES:   Allergies   Allergen Reactions    Ace Inhibitors Angioedema and Unknown    Hydrochlorothiazide Swelling    Iodinated Contrast Media Unknown    Iodine Other     Gout    Losartan Unknown    Shellfish Derived Unknown and Other     Shellfish-derived Products    Gout        MEDICATIONS:     Current Outpatient Medications:     allopurinol (Zyloprim) 100 mg tablet, Take 1 tablet (100 mg) by mouth 3 times a day., Disp: , Rfl:     amLODIPine (Norvasc) 10 mg tablet, TAKE 1 TABLET BY MOUTH EVERY DAY FOR BLOOD PRESSURE, Disp: 90 tablet, Rfl: 3    amLODIPine (Norvasc) 5 mg tablet, Take 6 mg by mouth once daily., Disp: , Rfl:     aspirin 81 " mg EC tablet, Take 1 tablet (81 mg) by mouth once daily., Disp: , Rfl:     colchicine, gout, (Mitigare) 0.6 mg capsule capsule, Take 2 capsules (1.2 mg) by mouth. FOR ONE DOSE THEN REPEAT ANOTHER TAB 1-2 HOURS LATER. MAY REPEAT IN 3 DAYS, Disp: , Rfl:     esomeprazole (NexIUM) 20 mg DR capsule, Take 1 capsule (20 mg) by mouth once daily., Disp: , Rfl:     ibuprofen 400 mg tablet, Take 1 tablet (400 mg) by mouth 3 times a day as needed., Disp: , Rfl:     meloxicam (Mobic) 15 mg tablet, Take 1 tablet (15 mg) by mouth once daily., Disp: 90 tablet, Rfl: 1    methocarbamol (Robaxin) 750 mg tablet, TAKE 1 TABLET 3 TIMES DAILY AS NEEDED FOR PAIN AND SPASMS, Disp: 60 tablet, Rfl: 2    tadalafil (Cialis) 20 mg tablet, Take 1 tablet (20 mg) by mouth. 1 hour before activity as needed, Disp: , Rfl:       SOCIAL HISTORY:  Patient  reports that he has never smoked. He has never used smokeless tobacco. He reports current alcohol use. He reports that he does not use drugs.   Social History     Socioeconomic History    Marital status:      Spouse name: Not on file    Number of children: Not on file    Years of education: Not on file    Highest education level: Not on file   Occupational History    Not on file   Tobacco Use    Smoking status: Never    Smokeless tobacco: Never   Substance and Sexual Activity    Alcohol use: Yes     Comment: socially    Drug use: Never    Sexual activity: Defer   Other Topics Concern    Not on file   Social History Narrative    Not on file     Social Drivers of Health     Financial Resource Strain: Not on file   Food Insecurity: Unknown (5/10/2024)    Received from [facility]    Hunger Vital Sign     Worried About Running Out of Food in the Last Year: Never true     Ran Out of Food in the Last Year: Not on file   Transportation Needs: Not on file   Physical Activity: Not on file   Stress: Not on file   Social Connections: Not on file   Intimate Partner Violence: Not on file   Housing  Stability: Not on file       FAMILY HISTORY:  Family History   Problem Relation Name Age of Onset    Prostate cancer Father         REVIEW OF SYSTEMS:  Constitutional: Negative for fever and chills. Denies anorexia, weight loss.  Eyes: Negative for visual disturbance.   Respiratory: Negative for shortness of breath.    Cardiovascular: Negative for chest pain.   Gastrointestinal: Negative for nausea and vomiting.   Genitourinary: See interval history above.  Skin: Negative for rash.   Neurological: Negative for dizziness and numbness.   Psychiatric/Behavioral: Negative for confusion and decreased concentration.     PHYSICAL EXAM:  There were no vitals taken for this visit.  Constitutional: Patient appears well-developed and well-nourished. No distress.    Head: Normocephalic and atraumatic.    Neck: Normal range of motion.    Cardiovascular: Normal rate.    Pulmonary/Chest: Effort normal. No respiratory distress.   Abdominal: soft NTND  Musculoskeletal: Normal range of motion.    Neurological: Alert and oriented to person, place, and time.  Psychiatric: Normal mood and affect. Behavior is normal. Thought content normal.      Lab Results   Component Value Date    BUN 14 03/13/2025    CREATININE 1.29 03/13/2025    EGFR 60 (L) 03/13/2025     03/13/2025    K 4.4 03/13/2025     (H) 03/13/2025    CO2 28 03/13/2025    CALCIUM 9.4 03/13/2025      Lab Results   Component Value Date    WBC 4.1 (L) 03/13/2025    RBC 5.36 03/13/2025    HGB 15.1 03/13/2025    HCT 45.8 03/13/2025    MCV 85 03/13/2025    MCH 28.2 03/13/2025    MCHC 33.0 03/13/2025    RDW 14.5 03/13/2025     03/13/2025        Lab Results   Component Value Date    PSA 0.26 12/07/2024    PSA 0.46 03/12/2023    PSA 0.46 03/12/2023    PSA 1.36 02/14/2023    PSA 0.34 01/03/2023    PSA 0.34 07/26/2022    PSA 0.39 01/25/2022    PSA 0.24 09/28/2021    PSA 0.33 05/13/2021    PSA 0.41 03/30/2021    PSA 0.69 02/28/2020    PSA 0.80 09/10/2019    PSA 0.62  04/05/2019    PSA 0.69 09/28/2018    PSA 1.37 03/16/2018           Assessment:     1. Lower urinary tract symptoms (LUTS)        2. Adenocarcinoma of prostate (Multi)          Plan:   Counseled patient to observe his current symptoms and if there's no resolution to either come to the clinic or seek care in ED   Will have patient continue with Dr. Brower for evaluation prior to AUS placement   If patient needs more urgent intervention for urinary retention he will call our office to arrange this.     22 minutes total spent on patient's care today; >50% time spent on counseling/coordination of care      Scribe Attestation  By signing my name below, Zoraida MAYORGA Scribe   attest that this documentation has been prepared under the direction and in the presence of Reyes Wilhelm MD.

## 2025-05-06 ENCOUNTER — APPOINTMENT (OUTPATIENT)
Dept: RADIOLOGY | Facility: HOSPITAL | Age: 70
End: 2025-05-06
Payer: MEDICARE

## 2025-05-06 ENCOUNTER — TELEPHONE (OUTPATIENT)
Dept: UROLOGY | Facility: HOSPITAL | Age: 70
End: 2025-05-06
Payer: MEDICARE

## 2025-05-06 VITALS
DIASTOLIC BLOOD PRESSURE: 99 MMHG | HEART RATE: 75 BPM | SYSTOLIC BLOOD PRESSURE: 141 MMHG | HEIGHT: 72 IN | TEMPERATURE: 98.1 F | OXYGEN SATURATION: 94 % | RESPIRATION RATE: 16 BRPM | WEIGHT: 215 LBS | BODY MASS INDEX: 29.12 KG/M2

## 2025-05-06 LAB
ALBUMIN SERPL BCP-MCNC: 4.3 G/DL (ref 3.4–5)
ALBUMIN SERPL BCP-MCNC: 4.3 G/DL (ref 3.4–5)
ALP SERPL-CCNC: 54 U/L (ref 33–136)
ALT SERPL W P-5'-P-CCNC: 12 U/L (ref 10–52)
ANION GAP SERPL CALC-SCNC: 14 MMOL/L (ref 10–20)
ANION GAP SERPL CALC-SCNC: 16 MMOL/L (ref 10–20)
APPEARANCE UR: CLEAR
AST SERPL W P-5'-P-CCNC: 20 U/L (ref 9–39)
BASOPHILS # BLD AUTO: 0.03 X10*3/UL (ref 0–0.1)
BASOPHILS NFR BLD AUTO: 0.6 %
BILIRUB SERPL-MCNC: 0.5 MG/DL (ref 0–1.2)
BILIRUB UR STRIP.AUTO-MCNC: NEGATIVE MG/DL
BUN SERPL-MCNC: 21 MG/DL (ref 6–23)
BUN SERPL-MCNC: 23 MG/DL (ref 6–23)
CALCIUM SERPL-MCNC: 9.4 MG/DL (ref 8.6–10.3)
CALCIUM SERPL-MCNC: 9.6 MG/DL (ref 8.6–10.3)
CHLORIDE SERPL-SCNC: 108 MMOL/L (ref 98–107)
CHLORIDE SERPL-SCNC: 109 MMOL/L (ref 98–107)
CO2 SERPL-SCNC: 19 MMOL/L (ref 21–32)
CO2 SERPL-SCNC: 21 MMOL/L (ref 21–32)
COLOR UR: COLORLESS
CREAT SERPL-MCNC: 1.49 MG/DL (ref 0.5–1.3)
CREAT SERPL-MCNC: 1.71 MG/DL (ref 0.5–1.3)
EGFRCR SERPLBLD CKD-EPI 2021: 43 ML/MIN/1.73M*2
EGFRCR SERPLBLD CKD-EPI 2021: 50 ML/MIN/1.73M*2
EOSINOPHIL # BLD AUTO: 0.23 X10*3/UL (ref 0–0.7)
EOSINOPHIL NFR BLD AUTO: 4.3 %
ERYTHROCYTE [DISTWIDTH] IN BLOOD BY AUTOMATED COUNT: 14.7 % (ref 11.5–14.5)
GLUCOSE SERPL-MCNC: 89 MG/DL (ref 74–99)
GLUCOSE SERPL-MCNC: 92 MG/DL (ref 74–99)
GLUCOSE UR STRIP.AUTO-MCNC: NORMAL MG/DL
HCT VFR BLD AUTO: 42.8 % (ref 41–52)
HGB BLD-MCNC: 14.3 G/DL (ref 13.5–17.5)
IMM GRANULOCYTES # BLD AUTO: 0.01 X10*3/UL (ref 0–0.7)
IMM GRANULOCYTES NFR BLD AUTO: 0.2 % (ref 0–0.9)
KETONES UR STRIP.AUTO-MCNC: NEGATIVE MG/DL
LEUKOCYTE ESTERASE UR QL STRIP.AUTO: NEGATIVE
LYMPHOCYTES # BLD AUTO: 1.55 X10*3/UL (ref 1.2–4.8)
LYMPHOCYTES NFR BLD AUTO: 28.8 %
MCH RBC QN AUTO: 28.6 PG (ref 26–34)
MCHC RBC AUTO-ENTMCNC: 33.4 G/DL (ref 32–36)
MCV RBC AUTO: 86 FL (ref 80–100)
MONOCYTES # BLD AUTO: 0.53 X10*3/UL (ref 0.1–1)
MONOCYTES NFR BLD AUTO: 9.8 %
NEUTROPHILS # BLD AUTO: 3.04 X10*3/UL (ref 1.2–7.7)
NEUTROPHILS NFR BLD AUTO: 56.3 %
NITRITE UR QL STRIP.AUTO: NEGATIVE
NRBC BLD-RTO: 0 /100 WBCS (ref 0–0)
PH UR STRIP.AUTO: 5.5 [PH]
PHOSPHATE SERPL-MCNC: 3 MG/DL (ref 2.5–4.9)
PLATELET # BLD AUTO: 177 X10*3/UL (ref 150–450)
POTASSIUM SERPL-SCNC: 4 MMOL/L (ref 3.5–5.3)
POTASSIUM SERPL-SCNC: 4.5 MMOL/L (ref 3.5–5.3)
PROT SERPL-MCNC: 6.6 G/DL (ref 6.4–8.2)
PROT UR STRIP.AUTO-MCNC: NEGATIVE MG/DL
RBC # BLD AUTO: 5 X10*6/UL (ref 4.5–5.9)
RBC # UR STRIP.AUTO: NEGATIVE MG/DL
SODIUM SERPL-SCNC: 138 MMOL/L (ref 136–145)
SODIUM SERPL-SCNC: 140 MMOL/L (ref 136–145)
SP GR UR STRIP.AUTO: 1.01
UROBILINOGEN UR STRIP.AUTO-MCNC: NORMAL MG/DL
WBC # BLD AUTO: 5.4 X10*3/UL (ref 4.4–11.3)

## 2025-05-06 PROCEDURE — 85025 COMPLETE CBC W/AUTO DIFF WBC: CPT | Performed by: INTERNAL MEDICINE

## 2025-05-06 PROCEDURE — 51798 US URINE CAPACITY MEASURE: CPT

## 2025-05-06 PROCEDURE — 74176 CT ABD & PELVIS W/O CONTRAST: CPT

## 2025-05-06 PROCEDURE — 80069 RENAL FUNCTION PANEL: CPT | Mod: CCI | Performed by: INTERNAL MEDICINE

## 2025-05-06 PROCEDURE — 2500000004 HC RX 250 GENERAL PHARMACY W/ HCPCS (ALT 636 FOR OP/ED): Mod: JZ | Performed by: INTERNAL MEDICINE

## 2025-05-06 PROCEDURE — 74176 CT ABD & PELVIS W/O CONTRAST: CPT | Mod: FOREIGN READ | Performed by: RADIOLOGY

## 2025-05-06 PROCEDURE — 36415 COLL VENOUS BLD VENIPUNCTURE: CPT | Performed by: INTERNAL MEDICINE

## 2025-05-06 PROCEDURE — 81003 URINALYSIS AUTO W/O SCOPE: CPT | Performed by: INTERNAL MEDICINE

## 2025-05-06 PROCEDURE — 80053 COMPREHEN METABOLIC PANEL: CPT | Performed by: INTERNAL MEDICINE

## 2025-05-06 PROCEDURE — 96360 HYDRATION IV INFUSION INIT: CPT | Mod: 59

## 2025-05-06 RX ADMIN — SODIUM CHLORIDE 1000 ML: 0.9 INJECTION, SOLUTION INTRAVENOUS at 01:37

## 2025-05-06 NOTE — DISCHARGE INSTRUCTIONS
You were seen today for difficulty urinating.  You have a stone in your urethra.  We recommended a Cast catheter which you have declined.  Please call your urologist today to further discuss.  Your evaluation was not concerning for an emergency at this time. Please see the attached information sheet for information about your condition, how to care for your condition at home, and reasons to return to the emergency department. Take any prescriptions written today as prescribed. You should call your primary care provider within 24 hours to tell them about today's visit, including any new medications or medication changes, as he or she may want to see you in the office for further evaluation. If you do not have a primary care provider, call  [phone] for an appointment. We offer in-person office visits as well as virtual options. Please do not hesitate to call  [phone] or return to the emergency department with any new or unresolved concerns or symptoms. Thank you for choosing [facility] for your care.

## 2025-05-06 NOTE — ED PROVIDER NOTES
HPI     CC: Difficulty Urinating     HPI: Arthur Harley is a 70 y.o. male with a history of nephrolithiasis, prostate CA s/p XRT, bulbar stricture s/p TURP 11/2023, BPH, ED, bladder spasms, who presents with difficulty urinating.  Patient states that this afternoon he developed urinary urgency and retention with dribbling.  He spoke to his urologist, Dr. Rosales, who told him to drink some fluids which did not really help.  He notes a weak stream with constant dripping.  He has had to wear depends and tight underwear.  He denies dysuria, hematuria, fevers, chills, or abdominal pain.  He does note some mild left-sided flank pain.  He states he was here a few months ago for similar symptoms and was told that he had passed a stone.  He is scheduled for an artificial sphincter in July.    ROS: 10-point review of systems was performed and is otherwise negative except as noted in HPI.    Limitations to history: N/A    Independent Historians: N/A    External Records Reviewed: Outpatient notes in EMR    Past Medical History: Noncontributory except per HPI     Past Surgical History: Noncontributory except per HPI     Family History: Reviewed and noncontributory     Social History:  Denies tobacco. Denies ETOH. Denies illicit drugs.    Social Determinants Affecting Care: N/A    RX Allergies[1]    Home Meds:   Current Outpatient Medications   Medication Instructions    allopurinol (Zyloprim) 100 mg tablet 1 tablet, 3 times daily    amLODIPine (NORVASC) 6 mg, Daily    amLODIPine (NORVASC) 10 mg, oral, Daily, for blood pressure    aspirin 81 mg EC tablet 1 tablet, Daily    colchicine, gout, (Mitigare) 0.6 mg capsule capsule 2 capsules    esomeprazole (NexIUM) 20 mg DR capsule 1 capsule, Daily    ibuprofen 400 mg tablet 1 tablet, 3 times daily PRN    meloxicam (MOBIC) 15 mg, oral, Daily    methocarbamol (Robaxin) 750 mg tablet TAKE 1 TABLET 3 TIMES DAILY AS NEEDED FOR PAIN AND SPASMS    tadalafil (Cialis) 20 mg tablet 1 tablet         Physical Exam     ED Triage Vitals [05/05/25 2300]   Temperature Heart Rate Respirations BP   36.7 °C (98.1 °F) 90 12 (!) 143/92      Pulse Ox Temp Source Heart Rate Source Patient Position   96 % Tympanic Monitor Sitting      BP Location FiO2 (%)     Right arm --         Heart Rate:  [84-90]   Temperature:  [36.7 °C (98.1 °F)]   Respirations:  [12-16]   BP: (143-154)/()   Height:  [182.9 cm (6')]   Weight:  [97.5 kg (215 lb)]   Pulse Ox:  [95 %-98 %]      Physical Exam  Vitals and nursing note reviewed.     CONSTITUTIONAL: Well appearing, well nourished, in no acute distress.   HENMT: Head atraumatic. Airway patent. Nasal mucosa clear. Mouth with normal mucosa, clear oropharynx. Uvula midline. Neck supple.    EYES: Clear bilaterally, pupils equally round and reactive to light.   CARDIOVASCULAR: Normal rate, regular rhythm.  Heart sounds S1, S2.  No murmurs, rubs or gallops. Normal pulses. Capillary refill < 2 sec.   RESPIRATORY: No increased work of breathing. Breath sounds clear and equal bilaterally.  GASTROINTESTINAL: Abdomen soft, non-distended, non-tender. No rebound, no guarding. Normal bowel sounds. No palpable masses.  GENITOURINARY: Minimal L CVA tenderness.  MUSCULOSKELETAL: Spine appears normal, range of motion is not limited, no muscle or joint tenderness. No edema.   NEUROLOGICAL: Alert and oriented, no asymmetry, moving all extremities equally.  SKIN: Warm, dry and intact. No rash or notable lesions.  PSYCHIATRIC: Normal mood and affect.  HEME/LYMPH: No adenopathy or splenomegaly.    Diagnostic Results      ECG: ECGs read and interpreted by me. See ED Course, below, for interpretation.    Labs Reviewed   CBC WITH AUTO DIFFERENTIAL - Abnormal       Result Value    WBC 5.4      nRBC 0.0      RBC 5.00      Hemoglobin 14.3      Hematocrit 42.8      MCV 86      MCH 28.6      MCHC 33.4      RDW 14.7 (*)     Platelets 177      Neutrophils % 56.3      Immature Granulocytes %, Automated 0.2       Lymphocytes % 28.8      Monocytes % 9.8      Eosinophils % 4.3      Basophils % 0.6      Neutrophils Absolute 3.04      Immature Granulocytes Absolute, Automated 0.01      Lymphocytes Absolute 1.55      Monocytes Absolute 0.53      Eosinophils Absolute 0.23      Basophils Absolute 0.03     COMPREHENSIVE METABOLIC PANEL - Abnormal    Glucose 89      Sodium 138      Potassium 4.5      Chloride 108 (*)     Bicarbonate 19 (*)     Anion Gap 16      Urea Nitrogen 23      Creatinine 1.71 (*)     eGFR 43 (*)     Calcium 9.6      Albumin 4.3      Alkaline Phosphatase 54      Total Protein 6.6      AST 20      Bilirubin, Total 0.5      ALT 12     URINALYSIS WITH REFLEX CULTURE AND MICROSCOPIC - Abnormal    Color, Urine Colorless (*)     Appearance, Urine Clear      Specific Gravity, Urine 1.010      pH, Urine 5.5      Protein, Urine NEGATIVE      Glucose, Urine Normal      Blood, Urine NEGATIVE      Ketones, Urine NEGATIVE      Bilirubin, Urine NEGATIVE      Urobilinogen, Urine Normal      Nitrite, Urine NEGATIVE      Leukocyte Esterase, Urine NEGATIVE     RENAL FUNCTION PANEL - Abnormal    Glucose 92      Sodium 140      Potassium 4.0      Chloride 109 (*)     Bicarbonate 21      Anion Gap 14      Urea Nitrogen 21      Creatinine 1.49 (*)     eGFR 50 (*)     Calcium 9.4      Phosphorus 3.0      Albumin 4.3     URINALYSIS WITH REFLEX CULTURE AND MICROSCOPIC    Narrative:     The following orders were created for panel order Urinalysis with Reflex Culture and Microscopic.  Procedure                               Abnormality         Status                     ---------                               -----------         ------                     Urinalysis with Reflex C...[[id number]]  Abnormal            Final result               Extra Urine Gray Tube[[id number]]                                                         Please view results for these tests on the individual orders.         CT abdomen pelvis wo IV contrast   Final  Result   1.Previously noted bladder stone appears to have migrated to the   region of the membranous urethra. This measured 6 mm. urology consult   is warranted.   2.Colonic diverticulosis without findings of diverticulitis.   3.Prominent coronary artery calcifications.   4.Bands of lower lobe atelectasis.   Signed by Noé Sanabria DO                    Bernarda Coma Scale Score: 15                  Procedure  Procedures    ED Course & MDM   Assessment/Plan:   Arthur Harley is a 70 y.o. male with a history of nephrolithiasis, prostate CA s/p XRT, bulbar stricture s/p TURP 11/2023, BPH, ED, bladder spasms, who presents with difficulty urinating since this afternoon with mild L CVAT.  He denies any infectious complaints.  Differential includes urinary retention, UTI, pyelonephritis, nephrolithiasis, bladder spasm.  Workup was initiated with labs, CTAP Noncon. See below for details of ED course and ultimate disposition.    Medications   sodium chloride 0.9 % bolus 1,000 mL (0 mL intravenous Stopped 5/6/25 0236)        ED Course as of 05/06/25 0527   Tue May 06, 2025   0116 Bladder scan 0 ml per RN [CG]   0116 Labs are notable for normal CBC, CMP with low bicarb of 19 but normal anion gap, creatinine slightly above baseline at 1.71, normal LFTs, negative urinalysis [CG]   0243 Patient did urinate and felt like he expelled a good amount. Repeat PVR 45 ml.  [CG]   0415 CTAP   1.Previously noted bladder stone appears to have migrated to the region of the membranous urethra. This measured 6 mm. urology consult is warranted.  2.Colonic diverticulosis without findings of diverticulitis.  3.Prominent coronary artery calcifications.  4.Bands of lower lobe atelectasis. [CG]   0418 Cast to be placed as he may be intermittently obstructing from this stone. Will see if stone passes with insertion.  [CG]   0451 RN attempted to place Cast but was unsuccessful.  Patient states he is urinating well now and is declining further  attempts.  Will repeat renal function panel. [CG]   0527 Renal function improving towards baseline.  Patient was advised to call his urologist later today.  Patient was discharged home with anticipatory guidance and strict return precautions. [CG]      ED Course User Index  [CG] Quiana Earl MD         Diagnoses as of 05/06/25 0527   Calculus in urethra       Disposition:   DISCHARGE.  The patient was discharged with both verbal and written anticipatory guidance and strict return precautions. Discharge diagnosis, instructions and plan were discussed and understood. I emphasized the importance of following up with their primary care provider within 24-48 hours and with any referrals in the timeframe recommended. At the time of discharge the patient was comfortable and was in no apparent distress. Patient is aware of diagnostic uncertainty and was notified though testing is negative here, there is a very small chance that pathology may be missed.  The patient understands these risks and the patient/family understood to call 911 or return immediately to the emergency department if the symptoms worsen or if they have any additional concerns.      FOLLOW UP  Primary care provider in 1-2 days.      ED Prescriptions    None         Quiana Earl MD  EM/IM/Peds    This note was dictated by speech recognition. Minor errors in transcription may be present.       [1]   Allergies  Allergen Reactions    Ace Inhibitors Angioedema and Unknown    Hydrochlorothiazide Swelling    Iodinated Contrast Media Unknown    Iodine Other     Gout    Losartan Unknown    Shellfish Derived Unknown and Other     Shellfish-derived Products    Gout        Quiana Earl MD  05/06/25 0527

## 2025-05-06 NOTE — ED TRIAGE NOTES
Pt BIBPV with c/o difficulty urinating. Pt states he attempted to use the restroom when arriv ing home from work and had urgency with weak stream. Pt reports mild bladder pain. States d/t his TURP procedure pt normally experiencing dribbling but urgency is new. No flank pain  Hx of prostate cancer and TURP

## 2025-05-07 ENCOUNTER — APPOINTMENT (OUTPATIENT)
Dept: OPHTHALMOLOGY | Facility: CLINIC | Age: 70
End: 2025-05-07
Payer: COMMERCIAL

## 2025-05-11 NOTE — PROGRESS NOTES
[facility]  Outpatient Clinic Note    Patient Name:  Arthur Harley  MRN:  [id number]  :  1955  Date of Service: 2025     Visit type: Follow up visit    HPI    Interval History:  Arthur Harley is a 70 y.o. male who is being seen today for  problems listed below.     Problem list/Chief complaints:  LUTS/urinary urgency  BPH - s/p TURP 23  ED - Tadalafil 20 mg  Prostate cancer - status post IMRT radiation therapy consisting of a dose of 81 Gy, completed 2009 with stable PSA   Nephrolithiasis    Visit with Dr. Wilhelm.   67-year-old male with history of recurrent nephrolithiasis, prostate cancer status post IMRT radiation therapy consisting of a dose of 81 Gy, completed 2009 with stable PSA, BPH and erectile dysfunction.   He was started on Flomax and we doubled the dose and states he was able to empty his bladder but continued to have a very slow stream and postvoid dribbling as well as urinary hesitancy and intermittent urinary incontinence.  After extensive discussion patient elected to undergo transurethral resection of the prostate.  The surgery was performed on 2023 and at that time patient was noted to have a dense bulbar urethral stricture.  The stricture was dilated and a TURP was performed resecting a modest amount of prostate tissue.       Postoperatively patient states his urinary flow is much improved however he had symptoms consisting of urge incontinence postoperatively.  He was started on trospium and discontinued his Flomax which improved his urinary leakage.  However patient states today he has urinary leakage now with changing of positions and occasionally with lifting objects.  He states he wears protective undergarments and not padded so he is unable to quantify how many pads a day he requires.  Patient states that his leakage has improved since surgery however he can still use that bothersome urinary leakage.  He denies any dysuria,  "hematuria, fevers, chills, nausea, vomiting. PVR today was 0cc He otherwise denies any urgency, frequency, hematuria, dysuria, fevers, chills, nausea, vomiting.      Interm history:  5/5/25: Pt met with Dr. Brower, plans to proceed with AUS placement. However, in the interval patient states he has begun having episodes of intense urinary urgency with only minimal urine output. States he only has \"dribbles\" come out when attempting to urinate. Denies any dysuria, hematuria, f/c/n/v.     5/6/25: Patient was seen in ER difficulty urinating. CT AP showed bladder stone that had migrated to urethra. Patient was able to void and was discharged with outpatient Urology follow up.    5/12/25: Patient presents for follow up and PVR. Patient has not passed bladder stone since ER visit. PVR 0 ml.    I personally reviewed CT AP dated 5/5/25.   - Impression -  1.Previously noted bladder stone appears to have migrated to the  region of the membranous urethra. This measured 6 mm. urology consult  is warranted.  2.Colonic diverticulosis without findings of diverticulitis.  3.Prominent coronary artery calcifications.  4.Bands of lower lobe atelectasis.    Medical History[1]    Surgical History[2]    Social History     Socioeconomic History    Marital status:      Spouse name: Not on file    Number of children: Not on file    Years of education: Not on file    Highest education level: Not on file   Occupational History    Not on file   Tobacco Use    Smoking status: Never    Smokeless tobacco: Never   Substance and Sexual Activity    Alcohol use: Yes     Comment: socially    Drug use: Never    Sexual activity: Defer   Other Topics Concern    Not on file   Social History Narrative    Not on file     Social Drivers of Health     Financial Resource Strain: Not on file   Food Insecurity: Unknown (5/10/2024)    Received from [facility] Vital Sign     Worried About Running Out of Food in the Last Year: Never true     " Ran Out of Food in the Last Year: Not on file   Transportation Needs: Not on file   Physical Activity: Not on file   Stress: Not on file   Social Connections: Not on file   Intimate Partner Violence: Not on file   Housing Stability: Not on file       Allergies[3]     Current Medications[4]     Review of system:  All other systems have been reviewed and are negative for complaints      Last recorded vitals:  There were no vitals taken for this visit.    Physical Exam:  General: Appears comfortable and in no apparent distress.  Head: Normocephalic, atraumatic  Eyes: Non-injected conjunctiva, sclera clear, no proptosis  Lungs: Breathing is easy, non-labored. Speaking in clear and complete sentences. Normal diaphragmatic movement.  Cardiovascular: no peripheral edema, cyanosis or pallor.   Abdomen: soft, non-distended, non-tender  : Bladder: non tender, not distended  MSK: Ambulatory with steady gait, unassisted  Skin: No visible rashes or lesions  Neurologic: Alert, oriented to person, place, and time  Psychiatric: mood and affect appropriate      Imaging  CT abdomen pelvis wo IV contrast  Narrative: STUDY:  CT Abdomen and Pelvis without IV Contrast; 5/6/2025 at 1:20 a.m.  INDICATION:  Left flank pain and difficulty urinating.  History of kidney stones.  COMPARISON:  CT AP 3/13/2025.  ACCESSION NUMBER(S):  [id number]  ORDERING CLINICIAN:  DODIE JETER  TECHNIQUE:  CT of the abdomen and pelvis was performed.  Contiguous axial images  were obtained at 3 mm slice thickness through the abdomen and pelvis.   Coronal and sagittal reconstructions at 3 mm slice thickness were  performed. No intravenous contrast was administered.    Automated mA/kV exposure control was utilized and patient examination  was performed in strict accordance with principles of ALARA.  FINDINGS:  Please note that the evaluation of vessels, lymph nodes and organs is  limited without intravenous contrast.      LOWER CHEST:  No cardiomegaly.   No pericardial effusion.  Prominent coronary artery  calcifications are demonstrated.  Lung bases reveals bands of lower  lobe atelectasis.     ABDOMEN:     LIVER:  No hepatomegaly.  Smooth surface contour.  Normal attenuation.     BILE DUCTS:  No intrahepatic or extrahepatic biliary ductal dilatation.     GALLBLADDER:  The gallbladder is unremarkable..  STOMACH:  No abnormalities identified.     PANCREAS:  No masses or ductal dilatation.     SPLEEN:  No splenomegaly or focal splenic lesion.     ADRENAL GLANDS:  No thickening or nodules.     KIDNEYS AND URETERS:  Kidneys are normal in size and location.  No renal or ureteral  calculi.     PELVIS:     BLADDER:  Previously noted bladder stone appears to have migrated to the region  of the membranous urethra.  This measured 6 mm  REPRODUCTIVE ORGANS:  No abnormalities identified.     BOWEL:  No abnormalities identified.  Colonic diverticulosis is demonstrated  without findings of diverticulitis.  Mild amount fecal debris  demonstrated throughout the colon.     VESSELS:  No abnormalities identified.  Abdominal aorta is normal in caliber.      PERITONEUM/RETROPERITONEUM/LYMPH NODES:  No free fluid.  No pneumoperitoneum.  No lymphadenopathy.     ABDOMINAL WALL:  No abnormalities identified.  SOFT TISSUES:   No abnormalities identified.     BONES:  No acute fracture or aggressive osseous lesion.  Advanced disc disease  is demonstrated L4-L5 and lesser extent L3-L4 and L5-S1.  Impression: 1.Previously noted bladder stone appears to have migrated to the  region of the membranous urethra. This measured 6 mm. urology consult  is warranted.  2.Colonic diverticulosis without findings of diverticulitis.  3.Prominent coronary artery calcifications.  4.Bands of lower lobe atelectasis.  Signed by Noé Sanabria DO      Labs  Lab Results   Component Value Date    WBC 5.4 05/06/2025    HGB 14.3 05/06/2025    HCT 42.8 05/06/2025    MCV 86 05/06/2025     05/06/2025     Lab  Results   Component Value Date    GLUCOSE 92 05/06/2025    CALCIUM 9.4 05/06/2025     05/06/2025    K 4.0 05/06/2025    CO2 21 05/06/2025     (H) 05/06/2025    BUN 21 05/06/2025    CREATININE 1.49 (H) 05/06/2025     Lab Results   Component Value Date    PSA 0.26 12/07/2024    PSA 0.46 03/12/2023    PSA 0.46 03/12/2023       Assessment and Plan:  Arthur Harley is a 70 y.o. male with history of prostate cancer, nephrolithiasis, ED, BPH, bladder stones, who presents for follow up.     Plan:  -Reviewed CT AP scan results with patient  -PVR 0 ml  -Prescription for Tamsulosin 0.4 mg daily sent to pharmacy for bladder stone. Common SE discussed.  -Patient was advised to present to local Emergency Department for development of fevers, chills, nausea, vomiting or flank pain that is not controlled with oral pain medication.  -AUS procedure scheduled on 7/2/25 with Dr. Brower  -Follow-up as scheduled, or sooner if needed, to reassess symptoms and for medication refill.    All questions and concerns were addressed. Patient verbalizes understanding and has no other questions at this time.     Some elements copied from Dr. Wilhelm's note on 5/5/25, the elements have been updated and all reflect current decision making from today, 05/12/25    E&M visit today is associated with current or anticipated ongoing medical care services related to a patient's single, serious condition or a complex condition.    ZOË Salazar-CNP   [facility]  05/12/25 4:39 PM         [1]   Past Medical History:  Diagnosis Date    Arthritis     BPH (benign prostatic hyperplasia)     ED (erectile dysfunction)     Glaucoma     Gout     Hyperlipidemia     Hypertension     Nephrolithiasis     Prostate cancer (Multi) 2009    radiation therapy completed 7/22/2009    Renal calculi     TIA (transient ischemic attack)     Vitamin D deficiency    [2]   Past Surgical History:  Procedure Laterality Date    COLONOSCOPY      KNEE ARTHROSCOPY  W/ ACL RECONSTRUCTION      MR HEAD ANGIO WO IV CONTRAST  06/25/2020    MR HEAD ANGIO WO IV CONTRAST 6/25/2020 [facility]    MR NECK ANGIO WO IV CONTRAST  06/25/2020    MR NECK ANGIO WO IV CONTRAST 6/25/2020 [facility]    ORIF RADIUS & ULNA FRACTURES     [3]   Allergies  Allergen Reactions    Ace Inhibitors Angioedema and Unknown    Hydrochlorothiazide Swelling    Iodinated Contrast Media Unknown    Iodine Other     Gout    Losartan Unknown    Shellfish Derived Unknown and Other     Shellfish-derived Products    Gout   [4]   Current Outpatient Medications:     allopurinol (Zyloprim) 100 mg tablet, Take 1 tablet (100 mg) by mouth 3 times a day., Disp: , Rfl:     amLODIPine (Norvasc) 10 mg tablet, TAKE 1 TABLET BY MOUTH EVERY DAY FOR BLOOD PRESSURE, Disp: 90 tablet, Rfl: 3    amLODIPine (Norvasc) 5 mg tablet, Take 6 mg by mouth once daily., Disp: , Rfl:     aspirin 81 mg EC tablet, Take 1 tablet (81 mg) by mouth once daily., Disp: , Rfl:     colchicine, gout, (Mitigare) 0.6 mg capsule capsule, Take 2 capsules (1.2 mg) by mouth. FOR ONE DOSE THEN REPEAT ANOTHER TAB 1-2 HOURS LATER. MAY REPEAT IN 3 DAYS, Disp: , Rfl:     esomeprazole (NexIUM) 20 mg DR capsule, Take 1 capsule (20 mg) by mouth once daily., Disp: , Rfl:     ibuprofen 400 mg tablet, Take 1 tablet (400 mg) by mouth 3 times a day as needed., Disp: , Rfl:     meloxicam (Mobic) 15 mg tablet, Take 1 tablet (15 mg) by mouth once daily., Disp: 90 tablet, Rfl: 1    methocarbamol (Robaxin) 750 mg tablet, TAKE 1 TABLET 3 TIMES DAILY AS NEEDED FOR PAIN AND SPASMS, Disp: 60 tablet, Rfl: 2    tadalafil (Cialis) 20 mg tablet, Take 1 tablet (20 mg) by mouth. 1 hour before activity as needed, Disp: , Rfl:     tamsulosin (Flomax) 0.4 mg 24 hr capsule, Take 1 capsule (0.4 mg) by mouth once daily. Do not crush, chew, or split., Disp: 30 capsule, Rfl: 2

## 2025-05-12 ENCOUNTER — OFFICE VISIT (OUTPATIENT)
Dept: UROLOGY | Facility: HOSPITAL | Age: 70
End: 2025-05-12
Payer: MEDICARE

## 2025-05-12 DIAGNOSIS — R39.15 URINARY URGENCY: Primary | ICD-10-CM

## 2025-05-12 DIAGNOSIS — N21.0 BLADDER STONES: ICD-10-CM

## 2025-05-12 PROCEDURE — 99214 OFFICE O/P EST MOD 30 MIN: CPT | Performed by: NURSE PRACTITIONER

## 2025-05-12 PROCEDURE — G2211 COMPLEX E/M VISIT ADD ON: HCPCS | Performed by: NURSE PRACTITIONER

## 2025-05-12 PROCEDURE — 1160F RVW MEDS BY RX/DR IN RCRD: CPT | Performed by: NURSE PRACTITIONER

## 2025-05-12 PROCEDURE — 1159F MED LIST DOCD IN RCRD: CPT | Performed by: NURSE PRACTITIONER

## 2025-05-12 PROCEDURE — 51798 US URINE CAPACITY MEASURE: CPT | Performed by: NURSE PRACTITIONER

## 2025-05-12 PROCEDURE — 1036F TOBACCO NON-USER: CPT | Performed by: NURSE PRACTITIONER

## 2025-05-12 RX ORDER — TAMSULOSIN HYDROCHLORIDE 0.4 MG/1
0.4 CAPSULE ORAL DAILY
Qty: 30 CAPSULE | Refills: 2 | Status: SHIPPED | OUTPATIENT
Start: 2025-05-12 | End: 2025-08-10

## 2025-05-20 ENCOUNTER — HOSPITAL ENCOUNTER (EMERGENCY)
Facility: HOSPITAL | Age: 70
Discharge: HOME | End: 2025-05-20
Payer: MEDICARE

## 2025-05-20 VITALS
BODY MASS INDEX: 29.12 KG/M2 | TEMPERATURE: 98.4 F | OXYGEN SATURATION: 98 % | DIASTOLIC BLOOD PRESSURE: 86 MMHG | SYSTOLIC BLOOD PRESSURE: 135 MMHG | WEIGHT: 215 LBS | HEART RATE: 88 BPM | RESPIRATION RATE: 16 BRPM | HEIGHT: 72 IN

## 2025-05-20 DIAGNOSIS — H10.32 ACUTE BACTERIAL CONJUNCTIVITIS OF LEFT EYE: Primary | ICD-10-CM

## 2025-05-20 PROCEDURE — 2500000001 HC RX 250 WO HCPCS SELF ADMINISTERED DRUGS (ALT 637 FOR MEDICARE OP): Performed by: PHYSICIAN ASSISTANT

## 2025-05-20 PROCEDURE — 99283 EMERGENCY DEPT VISIT LOW MDM: CPT

## 2025-05-20 PROCEDURE — 2500000005 HC RX 250 GENERAL PHARMACY W/O HCPCS: Performed by: PHYSICIAN ASSISTANT

## 2025-05-20 RX ORDER — POLYMYXIN B SULFATE AND TRIMETHOPRIM 1; 10000 MG/ML; [USP'U]/ML
1 SOLUTION OPHTHALMIC
Status: DISCONTINUED | OUTPATIENT
Start: 2025-05-20 | End: 2025-05-21 | Stop reason: HOSPADM

## 2025-05-20 RX ORDER — POLYMYXIN B SULFATE AND TRIMETHOPRIM 1; 10000 MG/ML; [USP'U]/ML
1 SOLUTION OPHTHALMIC
Qty: 10 ML | Refills: 0 | Status: SHIPPED | OUTPATIENT
Start: 2025-05-20 | End: 2025-05-27

## 2025-05-20 RX ORDER — TETRACAINE HYDROCHLORIDE 5 MG/ML
1 SOLUTION OPHTHALMIC ONCE
Status: COMPLETED | OUTPATIENT
Start: 2025-05-20 | End: 2025-05-20

## 2025-05-20 RX ADMIN — FLUORESCEIN SODIUM 1 STRIP: 1 STRIP OPHTHALMIC at 23:18

## 2025-05-20 RX ADMIN — POLYMYXIN B SULFATE AND TRIMETHOPRIM 1 DROP: 10000; 1 SOLUTION OPHTHALMIC at 23:36

## 2025-05-20 RX ADMIN — TETRACAINE HYDROCHLORIDE 1 DROP: 5 SOLUTION OPHTHALMIC at 23:19

## 2025-05-20 ASSESSMENT — PAIN DESCRIPTION - PROGRESSION: CLINICAL_PROGRESSION: NOT CHANGED

## 2025-05-20 ASSESSMENT — PAIN - FUNCTIONAL ASSESSMENT: PAIN_FUNCTIONAL_ASSESSMENT: 0-10

## 2025-05-20 ASSESSMENT — PAIN DESCRIPTION - LOCATION: LOCATION: EYE

## 2025-05-20 ASSESSMENT — PAIN DESCRIPTION - PAIN TYPE: TYPE: ACUTE PAIN

## 2025-05-20 ASSESSMENT — VISUAL ACUITY
OU: 20/15
OD: 20/15
OS: 20/50

## 2025-05-20 ASSESSMENT — PAIN SCALES - GENERAL
PAINLEVEL_OUTOF10: 7
PAINLEVEL_OUTOF10: 7

## 2025-05-20 ASSESSMENT — PAIN DESCRIPTION - ORIENTATION: ORIENTATION: LEFT

## 2025-05-20 NOTE — Clinical Note
Arthur Harley was seen and treated in our emergency department on 5/20/2025.  He may return to work on 05/23/2025.       If you have any questions or concerns, please don't hesitate to call.      Alyssa Eli PA-C

## 2025-05-21 NOTE — DISCHARGE INSTRUCTIONS
Please begin using Polytrim eyedrops.  1 drop every 4 hours while awake for the next 7 days.  Please wash your sheets after 24 hours of using antibiotics.  Follow-up with your eye doctor within the next 2 to 5 days.  If you develop any new or worsening symptoms please go straight to [facility] emergency department where they have ophthalmology on-call 24/7.

## 2025-05-21 NOTE — ED PROVIDER NOTES
"Chief Complaint   Patient presents with    Eye Pain    Eye Problem     HPI:   Arthur Harley is an 70 y.o. male with history of prostate cancer (s/p TURP), left eye cataract removal (2021), HTN, CAD, gout who presents to the ED with wife for evaluation of left eye pain and redness that began today.  He said he woke up and his left eye was sore.  He noted crusting and discharge around the eye.  Throughout the day it began to become sore and felt irritated.  He looked in the mirror at work and noticed that it was very red.  He took Zyrtec but it did not help.  He denies any vision changes including no double vision, blurry vision, loss of vision.  Said he has had flashers and floaters ever since he had his lens repaired years ago and his ophthalmologist told him that \"my brain would just get used to them\".  He denies any rashes, fevers, chills.  Does not wear contacts nor glasses.     Physical Exam  Vitals and nursing note reviewed.   Constitutional:       General: He is not in acute distress.     Appearance: Normal appearance. He is not ill-appearing or toxic-appearing.      Comments: Pleasant.   HENT:      Right Ear: External ear normal.      Left Ear: External ear normal.      Nose: Nose normal.      Mouth/Throat:      Mouth: Mucous membranes are moist.   Eyes:      Extraocular Movements: Extraocular movements intact.      Pupils: Pupils are equal, round, and reactive to light.      Comments: No pain or dizziness with eye movement.  No nystagmus.  Right sclera injected.  Conjunctival erythema.  Dry crusting around lower eyelid.  No fluorescein uptake.  IOP 15 mmHg.  No surrounding cellulitic changes   Cardiovascular:      Rate and Rhythm: Normal rate and regular rhythm.      Pulses: Normal pulses.      Heart sounds: Normal heart sounds.   Pulmonary:      Effort: Pulmonary effort is normal. No respiratory distress.      Breath sounds: Normal breath sounds.   Musculoskeletal:         General: Normal range of " motion.      Cervical back: Normal range of motion.   Lymphadenopathy:      Cervical: No cervical adenopathy.   Skin:     General: Skin is warm and dry.   Neurological:      Mental Status: He is alert.      Cranial Nerves: No cranial nerve deficit.     VS: As documented in the triage note and EMR flowsheet from this visit were reviewed.      Medical Decision Making:   ED Course as of 05/22/25 0045   Tue May 20, 2025   2207 Vitals Reviewed: Afebrile. Hypertensive. Not tachycardic nor tachypneic. No hypoxia.   [KA]   1473 Patient is a pleasant 70-year-old male that presents to the ED for evaluation of left eye redness, discharge, tearing and soreness.  On exam left sclera is injected.  He has conjunctival erythema.  Crusting around the lower eyelid.  No surrounding signs of orbital cellulitis.  No proptosis.  No fluorescein uptake to indicate corneal abrasion.  IOP is 15 mmHg.  I low suspicion for acute angle glaucoma or iritis.  Do not feel he necessitates emergent ophthalmology consult.  Patient did have some relief following tetracaine administration.  Will initiate on Polytrim.  I advised he should follow-up with ophthalmology and return to ED for any new or worsening symptoms.  He is agreeable. [KA]      ED Course User Index  [KA] Alyssa Eli PA-C         Diagnoses as of 05/22/25 0045   Acute bacterial conjunctivitis of left eye      Escalation of Care: Appropriate for outpatient management.     Counseling: Spoke with the patient and discussed today´s findings, in addition to providing specific details for the plan of care and expected course.  Patient was given the opportunity to ask questions.    Discussed return precautions and importance of follow-up.  Advised to follow-up with ophthalmology   Advised to return to the ED for changing or worsening symptoms, new symptoms, complaint specific precautions, and precautions listed on the discharge paperwork.  Educated on the common potential side effects of  medications prescribed.    I advised the patient that the emergency evaluation and treatment provided today doesn't end their need for medical care. It is very important that they follow-up with their primary care provider or other specialist as instructed.    The plan of care was mutually agreed upon with the patient. The patient and/or family were given the opportunity to ask questions. All questions asked today in the ED were answered to the best of my ability with today's information.    I specifically advised the patient to return to the ED for changing or worsening symptoms, worrisome new symptoms, or for any complaint specific precautions listed on the discharge paperwork.    This patient was cared for in the setting of nationwide stress on resources and staffing.    This report was transcribed using voice recognition software.  Every effort was made to ensure accuracy, however, inadvertently computerized transcription errors may be present.       Alyssa Eli PA-C  05/22/25 0045

## 2025-05-21 NOTE — ED TRIAGE NOTES
Pt from home c/o left eye pain. Pt left eye is red in triage. Pt states that his eye turned red today. Pt states he has pain when blinking. Pt denies any itching or drainage. Pt states he has had surgery on this eye previously in the early 2000s.

## 2025-05-27 ENCOUNTER — OFFICE VISIT (OUTPATIENT)
Dept: OPHTHALMOLOGY | Facility: CLINIC | Age: 70
End: 2025-05-27
Payer: MEDICARE

## 2025-05-27 DIAGNOSIS — H35.61 DOT AND BLOT HEMORRHAGE, RIGHT: ICD-10-CM

## 2025-05-27 DIAGNOSIS — H53.483 GENERALIZED CONTRACTION OF VISUAL FIELD, BILATERAL: ICD-10-CM

## 2025-05-27 DIAGNOSIS — H43.391 VITREOUS FLOATERS OF RIGHT EYE: Primary | ICD-10-CM

## 2025-05-27 PROCEDURE — 92134 CPTRZ OPH DX IMG PST SGM RTA: CPT

## 2025-05-27 PROCEDURE — 99213 OFFICE O/P EST LOW 20 MIN: CPT

## 2025-05-27 ASSESSMENT — PACHYMETRY
OS_CT(UM): 546
OD_CT(UM): 551

## 2025-05-27 ASSESSMENT — CUP TO DISC RATIO
OD_RATIO: 0.6
OS_RATIO: 0.65

## 2025-05-27 ASSESSMENT — ENCOUNTER SYMPTOMS
GASTROINTESTINAL NEGATIVE: 0
CARDIOVASCULAR NEGATIVE: 0
EYES NEGATIVE: 1
PSYCHIATRIC NEGATIVE: 0
MUSCULOSKELETAL NEGATIVE: 0
ALLERGIC/IMMUNOLOGIC NEGATIVE: 0
ENDOCRINE NEGATIVE: 0
CONSTITUTIONAL NEGATIVE: 0
NEUROLOGICAL NEGATIVE: 0
RESPIRATORY NEGATIVE: 0
HEMATOLOGIC/LYMPHATIC NEGATIVE: 0

## 2025-05-27 ASSESSMENT — TONOMETRY
OS_IOP_MMHG: 18
OD_IOP_MMHG: 13
IOP_METHOD: GOLDMANN APPLANATION

## 2025-05-27 ASSESSMENT — EXTERNAL EXAM - RIGHT EYE: OD_EXAM: NORMAL

## 2025-05-27 ASSESSMENT — VISUAL ACUITY
OS_PH_SC: 20/30
OS_SC: 20/70-1
OD_SC: 20/20
METHOD: SNELLEN - LINEAR

## 2025-05-27 ASSESSMENT — SLIT LAMP EXAM - LIDS
COMMENTS: NORMAL
COMMENTS: NORMAL

## 2025-05-27 ASSESSMENT — EXTERNAL EXAM - LEFT EYE: OS_EXAM: NORMAL

## 2025-05-27 NOTE — PROGRESS NOTES
Floater, right eye  No PVD, tear, hole, or detachment noted on scleral depressed exam, right eye  Retinal detachment (RD) return precautions discussed including multiple floaters, flashes of light, or curtain in vision     Vitreous floaters of left eye  S/p core mid-peripheral vitrectomy with Dr. Arevalo in 2021 for retained lens material after CEIOL, Vitreous condensations noted OS 3/14/25 and 05/27/25  Discussed option of pars plana vitrectomy (PPV)/floaterectomy OS with pt vis-a-vis ADLs, quality of life, pt elects to continue to observe at present  Discussed signs and symptoms of retinal hole, tear, detachment. Patient educated that retinal detachment can lead to permanent vision loss. Patient voices understanding to return if they notice new floaters, flashes, curtain or veil covering vision.     OCT mac : 5/27/2025   OD: Normal Foveal Contour & Retinal laminations, EZ line preserved, (-) IRF/subretinal fluid (SRF), CST-WNL  OS: Normal Foveal Contour & Retinal laminations, EZ line preserved, (-) IRF/subretinal fluid (SRF), CST-WNL     Glaucoma suspect of both eyes  Recommend baseline OCT RNFL and HVF with Dr Lane    Dot-blot hemorrhage of the right eye  -Noted today on examination  -+history of HTN, no history of T2DM, may be underlying this history, discussed with pt who expresses understanding

## 2025-06-05 DIAGNOSIS — N21.0 BLADDER STONES: ICD-10-CM

## 2025-06-05 RX ORDER — TAMSULOSIN HYDROCHLORIDE 0.4 MG/1
0.4 CAPSULE ORAL DAILY
Qty: 90 CAPSULE | Refills: 3 | Status: SHIPPED | OUTPATIENT
Start: 2025-06-05 | End: 2026-06-05

## 2025-06-13 DIAGNOSIS — N21.0 BLADDER STONES: ICD-10-CM

## 2025-06-13 RX ORDER — TAMSULOSIN HYDROCHLORIDE 0.4 MG/1
0.4 CAPSULE ORAL DAILY
Qty: 90 CAPSULE | Refills: 3 | Status: SHIPPED | OUTPATIENT
Start: 2025-06-13 | End: 2026-06-13

## 2025-06-17 ENCOUNTER — APPOINTMENT (OUTPATIENT)
Dept: PRIMARY CARE | Facility: CLINIC | Age: 70
End: 2025-06-17
Payer: MEDICARE

## 2025-06-26 ENCOUNTER — APPOINTMENT (OUTPATIENT)
Dept: PREADMISSION TESTING | Facility: HOSPITAL | Age: 70
End: 2025-06-26
Payer: MEDICARE

## 2025-06-27 ENCOUNTER — PROCEDURE VISIT (OUTPATIENT)
Dept: UROLOGY | Facility: HOSPITAL | Age: 70
End: 2025-06-27
Payer: MEDICARE

## 2025-06-27 ENCOUNTER — PRE-ADMISSION TESTING (OUTPATIENT)
Dept: PREADMISSION TESTING | Facility: HOSPITAL | Age: 70
End: 2025-06-27
Payer: MEDICARE

## 2025-06-27 VITALS — HEART RATE: 77 BPM | DIASTOLIC BLOOD PRESSURE: 93 MMHG | SYSTOLIC BLOOD PRESSURE: 131 MMHG

## 2025-06-27 DIAGNOSIS — C61 ADENOCARCINOMA OF PROSTATE (MULTI): ICD-10-CM

## 2025-06-27 DIAGNOSIS — N39.3 PRIMARY STRESS URINARY INCONTINENCE: Primary | ICD-10-CM

## 2025-06-27 DIAGNOSIS — R39.9 LOWER URINARY TRACT SYMPTOMS (LUTS): ICD-10-CM

## 2025-06-27 LAB
POC APPEARANCE, URINE: CLEAR
POC BILIRUBIN, URINE: NEGATIVE
POC BLOOD, URINE: NEGATIVE
POC COLOR, URINE: YELLOW
POC GLUCOSE, URINE: NEGATIVE MG/DL
POC KETONES, URINE: NEGATIVE MG/DL
POC LEUKOCYTES, URINE: NEGATIVE
POC NITRITE,URINE: NEGATIVE
POC PH, URINE: 6 PH
POC PROTEIN, URINE: NEGATIVE MG/DL
POC SPECIFIC GRAVITY, URINE: >=1.03
POC UROBILINOGEN, URINE: 0.2 EU/DL

## 2025-06-27 PROCEDURE — 99213 OFFICE O/P EST LOW 20 MIN: CPT | Performed by: UROLOGY

## 2025-06-27 PROCEDURE — 99213 OFFICE O/P EST LOW 20 MIN: CPT | Mod: 25 | Performed by: UROLOGY

## 2025-06-27 PROCEDURE — 81003 URINALYSIS AUTO W/O SCOPE: CPT | Mod: QW | Performed by: UROLOGY

## 2025-06-27 PROCEDURE — 52000 CYSTOURETHROSCOPY: CPT | Performed by: UROLOGY

## 2025-06-27 NOTE — PROGRESS NOTES
NPV    Referred by Dr Wilhelm     HISTORY OF PRESENT ILLNESS:   Arthur Harley is a 70 y.o. male who is being seen today for consultation    history of recurrent nephrolithiasis, prostate cancer status post IMRT radiation therapy consisting of a dose of 81 Gy, completed July 22, 2009 with stable PSA, BPH and erectile dysfunction.     Pt had TURP Nov 2023, had dense bulbar stricture dilated at that time as well. Postoperatively patient states his urinary flow is much improved however he had symptoms consisting of urge incontinence postoperatively. He was started on trospium and discontinued    Patient more recently having BRADEN, urinary leakage now with changing of positions and occasionally with lifting objects. He states he wears protective undergarments.     Seen by Herlinda Sidhu at [facility] .  Underwent botox injections with no improvement.  Had cystoscopy 9/20/24 - Urethra: Normal until proximal bulb~17F stenosis from prox bulb to bladder neck. Stable in appearance, no dystrophic calcifications Trabeculated blader    Patient recently in ER with difficulty urinating. Found to have ?stone in urethra. Reports he has continued leakage.  Dry at night.      PAST MEDICAL HISTORY:  Medical History[1]    PAST SURGICAL HISTORY:  Surgical History[2]     ALLERGIES:   Allergies[3]     MEDICATIONS:   Current Outpatient Medications   Medication Instructions    allopurinol (Zyloprim) 100 mg tablet 1 tablet, oral, 3 times daily PRN    amLODIPine (NORVASC) 6 mg, Daily    amLODIPine (NORVASC) 10 mg, oral, Daily, for blood pressure    aspirin 81 mg EC tablet 1 tablet, Daily    colchicine, gout, (Mitigare) 0.6 mg capsule capsule 2 capsules    esomeprazole (NEXIUM) 20 mg, oral, Daily PRN    ibuprofen 400 mg tablet 1 tablet, 3 times daily PRN    meloxicam (MOBIC) 15 mg, oral, Daily    methocarbamol (Robaxin) 750 mg tablet TAKE 1 TABLET 3 TIMES DAILY AS NEEDED FOR PAIN AND SPASMS    tadalafil (Cialis) 20 mg tablet 1 tablet    tamsulosin  (FLOMAX) 0.4 mg, oral, Daily, Do not crush, chew, or split.        PHYSICAL EXAM:  There were no vitals taken for this visit.  Constitutional: Patient appears well-developed and well-nourished. No distress.    Pulmonary/Chest: Effort normal. No respiratory distress.   Musculoskeletal: Normal range of motion.    Neurological: Alert and oriented to person, place, and time.  Psychiatric: Normal mood and affect. Behavior is normal. Thought content normal.      Labs  Lab Results   Component Value Date    TESTOSTERONE 307 09/28/2018    TESTOSTERONE 316 03/16/2018     Lab Results   Component Value Date    PSA 0.26 12/07/2024     Lab Results   Component Value Date    GFRMALE 50 (A) 06/26/2023     Lab Results   Component Value Date    CREATININE 1.49 (H) 05/06/2025     Lab Results   Component Value Date    CHOL 174 05/13/2021     Lab Results   Component Value Date    HDL 43.6 05/13/2021     Lab Results   Component Value Date    CHHDL 4.0 05/13/2021     Lab Results   Component Value Date    LDLF 105 (H) 05/13/2021     Lab Results   Component Value Date    VLDL 26 05/13/2021     Lab Results   Component Value Date    TRIG 128 05/13/2021     Lab Results   Component Value Date    HGBA1C 5.8 07/02/2021     Lab Results   Component Value Date    HCT 42.8 05/06/2025     Procedures  Procedure:  After informed consent was obtained, the patient was taken to the procedure room for cystoscopy due to UI, h/o stricture.     Cystoscopy     Procedure Note:    A sterile prep and drape was performed in standard fashion. Lidocaine was used for topical anesthesia. A flexible cystoscope was inserted into the urethra without difficulty revealing normal penile urethra.     A dense 14Fr stricture noted at proximal bulbar urethra, unable to get scope past    Post-Procedure:   The cystoscope was removed. The vital signs were stable . The patient tolerated the procedure well. There were no complications.      Assessment:      1. Primary stress urinary  incontinence        2. Lower urinary tract symptoms (LUTS)  POCT UA Automated manually resulted      3. Adenocarcinoma of prostate (Multi)            Arthur Harley is a 70 y.o. male      Plan:   Patient with bothersome BRADEN, but also complicated with bulbar stricture.  On cysto today unable to get scope past.  Worry about putting in AUS prior to addressing this.  Will cancel AUS next week and have him see reconstructive urology partner.  Will try cunningham clamp in meantime.    Pt agreeable to plan        [1]   Past Medical History:  Diagnosis Date    Arthritis     BPH (benign prostatic hyperplasia)     ED (erectile dysfunction)     Glaucoma     Gout     Hyperlipidemia     Hypertension     Nephrolithiasis     Prostate cancer (Multi) 2009    radiation therapy completed 7/22/2009    Renal calculi     TIA (transient ischemic attack)     Vitamin D deficiency    [2]   Past Surgical History:  Procedure Laterality Date    COLONOSCOPY      KNEE ARTHROSCOPY W/ ACL RECONSTRUCTION      MR HEAD ANGIO WO IV CONTRAST  06/25/2020    MR HEAD ANGIO WO IV CONTRAST 6/25/2020 [facility]    MR NECK ANGIO WO IV CONTRAST  06/25/2020    MR NECK ANGIO WO IV CONTRAST 6/25/2020 [facility]    ORIF RADIUS & ULNA FRACTURES     [3]   Allergies  Allergen Reactions    Ace Inhibitors Angioedema and Unknown    Hydrochlorothiazide Swelling    Iodinated Contrast Media Unknown     Gout reaction    Iodine Other     Gout    Losartan Swelling    Shellfish Derived Unknown and Other     Shellfish-derived Products    Gout

## 2025-07-01 ENCOUNTER — APPOINTMENT (OUTPATIENT)
Dept: UROLOGY | Facility: CLINIC | Age: 70
End: 2025-07-01
Payer: MEDICARE

## 2025-07-01 VITALS — BODY MASS INDEX: 29.12 KG/M2 | HEIGHT: 72 IN | WEIGHT: 215 LBS

## 2025-07-01 DIAGNOSIS — C61 ADENOCARCINOMA OF PROSTATE (MULTI): ICD-10-CM

## 2025-07-01 DIAGNOSIS — N39.3 STRESS INCONTINENCE: ICD-10-CM

## 2025-07-01 DIAGNOSIS — N21.0 BLADDER STONES: ICD-10-CM

## 2025-07-01 DIAGNOSIS — N39.46 MIXED STRESS AND URGE URINARY INCONTINENCE: ICD-10-CM

## 2025-07-01 DIAGNOSIS — N52.9 VASCULOGENIC ERECTILE DYSFUNCTION, UNSPECIFIED VASCULOGENIC ERECTILE DYSFUNCTION TYPE: Primary | ICD-10-CM

## 2025-07-01 PROCEDURE — 1160F RVW MEDS BY RX/DR IN RCRD: CPT | Performed by: STUDENT IN AN ORGANIZED HEALTH CARE EDUCATION/TRAINING PROGRAM

## 2025-07-01 PROCEDURE — 3008F BODY MASS INDEX DOCD: CPT | Performed by: STUDENT IN AN ORGANIZED HEALTH CARE EDUCATION/TRAINING PROGRAM

## 2025-07-01 PROCEDURE — 1036F TOBACCO NON-USER: CPT | Performed by: STUDENT IN AN ORGANIZED HEALTH CARE EDUCATION/TRAINING PROGRAM

## 2025-07-01 PROCEDURE — G2211 COMPLEX E/M VISIT ADD ON: HCPCS | Performed by: STUDENT IN AN ORGANIZED HEALTH CARE EDUCATION/TRAINING PROGRAM

## 2025-07-01 PROCEDURE — 99214 OFFICE O/P EST MOD 30 MIN: CPT | Performed by: STUDENT IN AN ORGANIZED HEALTH CARE EDUCATION/TRAINING PROGRAM

## 2025-07-01 PROCEDURE — 1159F MED LIST DOCD IN RCRD: CPT | Performed by: STUDENT IN AN ORGANIZED HEALTH CARE EDUCATION/TRAINING PROGRAM

## 2025-07-01 NOTE — PROGRESS NOTES
Scribed for Dr. Eladio Watkins by Jimmy Kerr. I, Dr. Eladio Watkins have personally reviewed and agreed with the information entered by the Virtual Scribe. 07/01/25.    ASSESSMENT:  Problem List Items Addressed This Visit       Adenocarcinoma of prostate (Multi)    ED (erectile dysfunction) - Primary    Mixed stress and urge urinary incontinence    Bladder stones     Other Visit Diagnoses         Stress incontinence        Relevant Orders    Cystourethroscopy           PLAN:  #Urethral stricture  #Urethral stone (?)  #BRADEN  Plan to proceed with cystoscopy (next available) in anticipation of urethral dilation. Will determine need for Optilume following cystoscopy. The goal of urethral dilation is to alleviate his stricture and obstructive urinary symptoms, albeit advised this may worsen his urinary incontinence. Once he has fully recovered will plan to repeat cystoscopy thereafter -- if no stricture recurrence, will revisit options for his BRADEN including AUS placement.      Discussion:  We discussed the natural history of urethral stricture disease in detail, including the high likelihood of recurrence given two or more previous endoscopic procedures. We discussed management options including ISC, urethral dilation in the OR or office, or urethroplasty. Advised on signs and symptoms of stricture recurrence, he will monitor and call if this is occurring.     #Prostate cancer ~ s/p IMRT  His PSA has remained stable as of Dec 2024.   Continue annual PSA screening.     All questions were answered to the patient’s satisfaction.  Patient agrees with the plan and wishes to proceed.  Continue follow-up for ongoing care of his chronic medical conditions.       History of Present Illness (HPI):  Arthur presents as a new patient for an evaluation.  The patient’s EMR has been reviewed.  Lives in [place], [place].  Occupation: .    History of prostate cancer s/p IMRT radiation therapy (completed July 2009) with  stable PSA, BPH s/p TURP (with Dr. Wilhelm, Nov 2023), urethral stricture, nephrolithiasis, mixed UI, and erectile dysfunction. Previously seen by Dr. Brower, and Dr. Wilhelm. Patient with bothersome BRADEN, but also complicated by a bulbar stricture.  On cysto (06/27/25) with Dr. Brower, was unable to get scope past. He was planned for AUS placement with Dr. Brower, but was postponed until his stricture has been addressed first, will manage with a cunningham clamp in the meantime. Referred to me for an evaluation and management of his stricture.     TODAY: (07/01/25)  Reports persistent lower urinary tract symptoms and BRADEN.   Has had persistent leakage since his TURP in 2023.   Typically wears 1-2x pads per day.   Wakes up dry, but leaks upon standing.   Wears 1x pad while at work  Unable to change at work.   Usually has a soaked pad by the time he get's home.    Per Dr. Brower (06/27/25)  Pt had TURP Nov 2023, had dense bulbar stricture dilated at that time as well. Postoperatively patient states his urinary flow is much improved however he had symptoms consisting of urge incontinence postoperatively. He was started on trospium but since discontinued. Patient more recently having BRADEN, urinary leakage now with changing of positions and occasionally with lifting objects. He states he wears protective undergarments.     Seen by Herlinda Sidhu at [facility] .  Underwent botox injections with no improvement.  Had cystoscopy 9/20/24 - Urethra: Normal until proximal bulb~17F stenosis from prox bulb to bladder neck. Stable in appearance, no dystrophic calcifications, trabeculated bladder.    Patient recently in ER (05/06/25) with difficulty urinating.   Found to have stone in urethra.   Reports continued leakage.    Dry at night.      CT A&P (05/06/25) personally reviewed and independently interpreted.  Previously noted bladder stone (~6 mm) appears to have migrated to the region of the membranous urethra.     PMH: HTN, HLD, CAD, prostate  cancer, nephrolithiasis, TIA, glaucoma, arthritis, gout, Vit D deficiency  PSH: knee arthroscopy  FH: prostate cancer (Father)  SH: Non-smoker.    Medical History[1]  Surgical History[2]  Family History[3]  Tobacco Use History[4]  Current Medications[5]  Allergies[6]  Past medical, surgical, family and social history in the chart was reviewed and is accurate including any additions to what is in this HPI.    REVIEW OF SYSTEMS (ROS):   Constitutional: denies any unintentional weight loss or change in strength.  Integumentary: denies any rashes or pruritus.  Eyes: denies any double vision or eye pain.  Ear/Nose/Mouth/Throat: denies any nosebleeds or gum bleeds.  Cardiovascular: denies any chest pain or syncope.  Respiratory: denies hemoptysis.  Gastrointestinal: denies nausea or vomiting.  Musculoskeletal: denies muscle cramping or weakness.  Neurologic: denies convulsions or seizures.  Hematologic/Lymphatic: denies bleeding tendencies.  Endocrine: denies heat/cold intolerance.  All other systems have been reviewed and are negative unless otherwise noted in the HPI.     OBJECTIVE:  There were no vitals taken for this visit.  PHYSICAL EXAM:  Constitutional: No obvious distress.  Eyes: Non-injected conjunctiva, sclera clear, EOMI.  Ears/Nose/Mouth/Throat: No obvious drainage per ears or nose.  Cardiovascular: Extremities are warm and well perfused. No edema, cyanosis or pallor.  Respiratory: No audible wheezing/stridor; respirations do not appear labored.  Gastrointestinal: Abdomen soft, not distended.  Musculoskeletal: Normal ROM of extremities.  Skin: No obvious rashes or open sores.  Neurologic: Alert and oriented, CN 2-12 grossly intact.  Psychiatric: Answers questions appropriately with normal affect.  Hematologic/Lymphatic/Immunologic: No obvious bruises or sites of spontaneous bleeding.  Genitourinary: No CVA tenderness, bladder not palpable.     LABS & IMAGING:  Basic Labs:  Lab Results   Component Value Date     WBC 5.4 05/06/2025    HGB 14.3 05/06/2025    HCT 42.8 05/06/2025     05/06/2025     05/06/2025    K 4.0 05/06/2025     (H) 05/06/2025    ALT 12 05/06/2025    AST 20 05/06/2025    CREATININE 1.49 (H) 05/06/2025    BUN 21 05/06/2025    CO2 21 05/06/2025    TSH 0.82 12/07/2024    INR 1.0 06/26/2023       Scribed for Dr. Eladio Watkins by Jimmy Kerr.  By signing my name below, I, Mushtaq Cardona attest that this documentation has been prepared under the direction and in the presence of Eladio Watkins MD. All medical record entries made by the Scribe were at my direction or personally dictated by me. I have reviewed the chart and agree that the record accurately reflects my personal performance of the history, physical exam, discussion and plan.           [1]   Past Medical History:  Diagnosis Date    Arthritis     BPH (benign prostatic hyperplasia)     ED (erectile dysfunction)     Glaucoma     Gout     Hyperlipidemia     Hypertension     Nephrolithiasis     Prostate cancer (Multi) 2009    radiation therapy completed 7/22/2009    Renal calculi     TIA (transient ischemic attack)     Vitamin D deficiency    [2]   Past Surgical History:  Procedure Laterality Date    COLONOSCOPY      KNEE ARTHROSCOPY W/ ACL RECONSTRUCTION      MR HEAD ANGIO WO IV CONTRAST  06/25/2020    MR HEAD ANGIO WO IV CONTRAST 6/25/2020 [facility]    MR NECK ANGIO WO IV CONTRAST  06/25/2020    MR NECK ANGIO WO IV CONTRAST 6/25/2020 [facility]    ORIF RADIUS & ULNA FRACTURES     [3]   Family History  Problem Relation Name Age of Onset    Heart failure Mother      Prostate cancer Father     [4]   Social History  Tobacco Use   Smoking Status Never   Smokeless Tobacco Never   [5]   Current Outpatient Medications   Medication Sig Dispense Refill    allopurinol (Zyloprim) 100 mg tablet Take 1 tablet (100 mg) by mouth 3 times a day as needed (Gout flare).      amLODIPine (Norvasc) 10 mg tablet TAKE 1 TABLET BY  MOUTH EVERY DAY FOR BLOOD PRESSURE 90 tablet 3    amLODIPine (Norvasc) 5 mg tablet Take 6 mg by mouth once daily.      aspirin 81 mg EC tablet Take 1 tablet (81 mg) by mouth once daily.      colchicine, gout, (Mitigare) 0.6 mg capsule capsule Take 2 capsules (1.2 mg) by mouth. FOR ONE DOSE THEN REPEAT ANOTHER TAB 1-2 HOURS LATER. MAY REPEAT IN 3 DAYS      esomeprazole (NexIUM) 20 mg DR capsule Take 1 capsule (20 mg) by mouth once daily as needed.      ibuprofen 400 mg tablet Take 1 tablet (400 mg) by mouth 3 times a day as needed.      meloxicam (Mobic) 15 mg tablet Take 1 tablet (15 mg) by mouth once daily. 90 tablet 1    methocarbamol (Robaxin) 750 mg tablet TAKE 1 TABLET 3 TIMES DAILY AS NEEDED FOR PAIN AND SPASMS 60 tablet 2    tadalafil (Cialis) 20 mg tablet Take 1 tablet (20 mg) by mouth. 1 hour before activity as needed      tamsulosin (Flomax) 0.4 mg 24 hr capsule Take 1 capsule (0.4 mg) by mouth once daily. Do not crush, chew, or split. (Patient not taking: Reported on 6/26/2025) 90 capsule 3     No current facility-administered medications for this visit.   [6]   Allergies  Allergen Reactions    Ace Inhibitors Angioedema and Unknown    Hydrochlorothiazide Swelling    Iodinated Contrast Media Unknown     Gout reaction    Iodine Other     Gout    Losartan Swelling    Shellfish Derived Unknown and Other     Shellfish-derived Products    Gout

## 2025-08-18 ENCOUNTER — APPOINTMENT (OUTPATIENT)
Dept: UROLOGY | Facility: CLINIC | Age: 70
End: 2025-08-18
Payer: MEDICARE

## 2025-08-18 VITALS
HEIGHT: 72 IN | TEMPERATURE: 97.1 F | SYSTOLIC BLOOD PRESSURE: 126 MMHG | DIASTOLIC BLOOD PRESSURE: 83 MMHG | HEART RATE: 84 BPM | BODY MASS INDEX: 29.12 KG/M2 | WEIGHT: 215 LBS

## 2025-08-18 DIAGNOSIS — N35.919 STRICTURE OF MALE URETHRA, UNSPECIFIED STRICTURE TYPE: Primary | ICD-10-CM

## 2025-08-18 DIAGNOSIS — N39.3 PRIMARY STRESS URINARY INCONTINENCE: ICD-10-CM

## 2025-08-18 LAB
POC APPEARANCE, URINE: CLEAR
POC BILIRUBIN, URINE: NEGATIVE
POC BLOOD, URINE: NEGATIVE
POC COLOR, URINE: YELLOW
POC GLUCOSE, URINE: NEGATIVE MG/DL
POC KETONES, URINE: NEGATIVE MG/DL
POC LEUKOCYTES, URINE: NEGATIVE
POC NITRITE,URINE: NEGATIVE
POC PH, URINE: 6.5 PH
POC PROTEIN, URINE: NEGATIVE MG/DL
POC SPECIFIC GRAVITY, URINE: 1.02
POC UROBILINOGEN, URINE: 1 EU/DL

## 2025-08-18 PROCEDURE — 52000 CYSTOURETHROSCOPY: CPT | Performed by: STUDENT IN AN ORGANIZED HEALTH CARE EDUCATION/TRAINING PROGRAM

## 2025-08-18 PROCEDURE — 99214 OFFICE O/P EST MOD 30 MIN: CPT | Performed by: STUDENT IN AN ORGANIZED HEALTH CARE EDUCATION/TRAINING PROGRAM

## 2025-08-18 PROCEDURE — 81003 URINALYSIS AUTO W/O SCOPE: CPT | Performed by: STUDENT IN AN ORGANIZED HEALTH CARE EDUCATION/TRAINING PROGRAM

## 2025-08-18 RX ORDER — CEFAZOLIN SODIUM 2 G/100ML
2 INJECTION, SOLUTION INTRAVENOUS ONCE
OUTPATIENT
Start: 2025-08-18 | End: 2025-08-18

## 2025-08-18 ASSESSMENT — PAIN SCALES - GENERAL: PAINLEVEL_OUTOF10: 0-NO PAIN

## 2025-08-20 ENCOUNTER — APPOINTMENT (OUTPATIENT)
Dept: UROLOGY | Facility: HOSPITAL | Age: 70
End: 2025-08-20
Payer: MEDICARE

## 2025-09-02 ENCOUNTER — APPOINTMENT (OUTPATIENT)
Dept: UROLOGY | Facility: CLINIC | Age: 70
End: 2025-09-02
Payer: MEDICARE

## 2025-10-06 ENCOUNTER — APPOINTMENT (OUTPATIENT)
Dept: UROLOGY | Facility: CLINIC | Age: 70
End: 2025-10-06
Payer: MEDICARE

## 2025-10-08 ENCOUNTER — APPOINTMENT (OUTPATIENT)
Dept: UROLOGY | Facility: CLINIC | Age: 70
End: 2025-10-08
Payer: MEDICARE

## 2026-01-26 ENCOUNTER — APPOINTMENT (OUTPATIENT)
Dept: UROLOGY | Facility: CLINIC | Age: 71
End: 2026-01-26
Payer: MEDICARE

## 2026-01-27 ENCOUNTER — APPOINTMENT (OUTPATIENT)
Dept: PRIMARY CARE | Facility: CLINIC | Age: 71
End: 2026-01-27
Payer: MEDICARE

## (undated) DEVICE — COVER, MAYO STAND, W/PAD, 23 IN, DISPOSABLE, PLASTIC, LF, STERILE

## (undated) DEVICE — PLUG, CATHETER

## (undated) DEVICE — SYRINGE, 60 CC, IRRIGATION, PISTON, CATH TIP, W/LUER ADAPTER,DISP

## (undated) DEVICE — TUBING, CLEAR N-COND, 5MM X 10, LF

## (undated) DEVICE — LUBRICANT, WATER SOLUBLE, BACTERIOSTATIC, 2 OZ, STERILE

## (undated) DEVICE — SYRINGE, 50 CC, LUER LOCK

## (undated) DEVICE — DRESSING, GAUZE, PETROLATUM, VASELINE, 3 X 36 IN, STERILE

## (undated) DEVICE — BAG, DRAINAGE, ANTI-REFLUX CHAMBER, 2000ML

## (undated) DEVICE — Device

## (undated) DEVICE — COLLECTION BAG, FLUID, NON-STERILE

## (undated) DEVICE — SLEEVE, VASO PRESS, CALF GARMENT, MEDIUM, GREEN

## (undated) DEVICE — EVACUATOR, BLADDER, ELLIK, PLASTIC

## (undated) DEVICE — CATHETER, SECURE DEVICE, URINARY, ADH

## (undated) DEVICE — CATHETER, URETHRAL, FOLEY, 2 WAY, BARDEX LUBRICATH, MEDIUM LENGTH, 20 FR, 30 CC

## (undated) DEVICE — CATHETER, IRRIGATION, CURITY, 22FR